# Patient Record
Sex: MALE | Race: WHITE | NOT HISPANIC OR LATINO | Employment: UNEMPLOYED | URBAN - METROPOLITAN AREA
[De-identification: names, ages, dates, MRNs, and addresses within clinical notes are randomized per-mention and may not be internally consistent; named-entity substitution may affect disease eponyms.]

---

## 2017-01-01 ENCOUNTER — APPOINTMENT (OUTPATIENT)
Dept: RADIOLOGY | Facility: MEDICAL CENTER | Age: 27
DRG: 394 | End: 2017-01-01
Attending: INTERNAL MEDICINE
Payer: COMMERCIAL

## 2017-01-01 PROCEDURE — 78226 HEPATOBILIARY SYSTEM IMAGING: CPT

## 2017-01-02 ENCOUNTER — APPOINTMENT (OUTPATIENT)
Dept: RADIOLOGY | Facility: MEDICAL CENTER | Age: 27
DRG: 394 | End: 2017-01-02
Attending: HOSPITALIST
Payer: COMMERCIAL

## 2017-01-02 PROCEDURE — 74176 CT ABD & PELVIS W/O CONTRAST: CPT

## 2017-01-06 ENCOUNTER — HOSPITAL ENCOUNTER (OUTPATIENT)
Facility: MEDICAL CENTER | Age: 27
End: 2017-02-02
Attending: HOSPITALIST | Admitting: HOSPITALIST
Payer: COMMERCIAL

## 2017-01-06 ENCOUNTER — RESOLUTE PROFESSIONAL BILLING HOSPITAL PROF FEE (OUTPATIENT)
Dept: HOSPITALIST | Facility: MEDICAL CENTER | Age: 27
End: 2017-01-06
Payer: COMMERCIAL

## 2017-01-06 PROCEDURE — 87500 VANOMYCIN DNA AMP PROBE: CPT

## 2017-01-06 PROCEDURE — 87641 MR-STAPH DNA AMP PROBE: CPT

## 2017-01-07 LAB
ALBUMIN SERPL BCP-MCNC: 2 G/DL (ref 3.2–4.9)
ALBUMIN/GLOB SERPL: 0.6 G/DL
ALP SERPL-CCNC: 54 U/L (ref 30–99)
ALT SERPL-CCNC: 11 U/L (ref 2–50)
ANION GAP SERPL CALC-SCNC: 9 MMOL/L (ref 0–11.9)
AST SERPL-CCNC: 18 U/L (ref 12–45)
BASOPHILS # BLD AUTO: 0.4 % (ref 0–1.8)
BASOPHILS # BLD: 0.04 K/UL (ref 0–0.12)
BILIRUB SERPL-MCNC: 0.7 MG/DL (ref 0.1–1.5)
BUN SERPL-MCNC: 13 MG/DL (ref 8–22)
CALCIUM SERPL-MCNC: 8.3 MG/DL (ref 8.4–10.2)
CHLORIDE SERPL-SCNC: 109 MMOL/L (ref 96–112)
CO2 SERPL-SCNC: 21 MMOL/L (ref 20–33)
CREAT SERPL-MCNC: 3.25 MG/DL (ref 0.5–1.4)
EOSINOPHIL # BLD AUTO: 0.15 K/UL (ref 0–0.51)
EOSINOPHIL NFR BLD: 1.6 % (ref 0–6.9)
ERYTHROCYTE [DISTWIDTH] IN BLOOD BY AUTOMATED COUNT: 36.6 FL (ref 35.9–50)
GFR SERPL CREATININE-BSD FRML MDRD: 23 ML/MIN/1.73 M 2
GLOBULIN SER CALC-MCNC: 3.1 G/DL (ref 1.9–3.5)
GLUCOSE SERPL-MCNC: 118 MG/DL (ref 65–99)
HCT VFR BLD AUTO: 34.5 % (ref 42–52)
HGB BLD-MCNC: 11.2 G/DL (ref 14–18)
IMM GRANULOCYTES # BLD AUTO: 0.11 K/UL (ref 0–0.11)
IMM GRANULOCYTES NFR BLD AUTO: 1.2 % (ref 0–0.9)
LYMPHOCYTES # BLD AUTO: 1.28 K/UL (ref 1–4.8)
LYMPHOCYTES NFR BLD: 14 % (ref 22–41)
MCH RBC QN AUTO: 26 PG (ref 27–33)
MCHC RBC AUTO-ENTMCNC: 32.5 G/DL (ref 33.7–35.3)
MCV RBC AUTO: 80 FL (ref 81.4–97.8)
MONOCYTES # BLD AUTO: 0.75 K/UL (ref 0–0.85)
MONOCYTES NFR BLD AUTO: 8.2 % (ref 0–13.4)
MRSA DNA SPEC QL NAA+PROBE: NORMAL
NEUTROPHILS # BLD AUTO: 6.81 K/UL (ref 1.82–7.42)
NEUTROPHILS NFR BLD: 74.6 % (ref 44–72)
NRBC # BLD AUTO: 0 K/UL
NRBC BLD AUTO-RTO: 0 /100 WBC
PLATELET # BLD AUTO: 338 K/UL (ref 164–446)
PMV BLD AUTO: 9.8 FL (ref 9–12.9)
POTASSIUM SERPL-SCNC: 3.3 MMOL/L (ref 3.6–5.5)
PROT SERPL-MCNC: 5.1 G/DL (ref 6–8.2)
RBC # BLD AUTO: 4.31 M/UL (ref 4.7–6.1)
SIGNIFICANT IND 70042: NORMAL
SITE SITE: NORMAL
SODIUM SERPL-SCNC: 139 MMOL/L (ref 135–145)
SOURCE SOURCE: NORMAL
VRE DNA SPEC QL NAA+PROBE: NEGATIVE
WBC # BLD AUTO: 9.1 K/UL (ref 4.8–10.8)

## 2017-01-07 PROCEDURE — 99232 SBSQ HOSP IP/OBS MODERATE 35: CPT | Performed by: HOSPITALIST

## 2017-01-07 PROCEDURE — 85025 COMPLETE CBC W/AUTO DIFF WBC: CPT

## 2017-01-07 PROCEDURE — 80053 COMPREHEN METABOLIC PANEL: CPT

## 2017-01-07 ASSESSMENT — ENCOUNTER SYMPTOMS
NAUSEA: 1
CONSTIPATION: 0
CARDIOVASCULAR NEGATIVE: 1
HEADACHES: 0
COUGH: 0
SHORTNESS OF BREATH: 0
FEVER: 0
BACK PAIN: 0
CONSTITUTIONAL NEGATIVE: 1
VOMITING: 1
RESPIRATORY NEGATIVE: 1
EYES NEGATIVE: 1
CHILLS: 0
NEUROLOGICAL NEGATIVE: 1
ABDOMINAL PAIN: 1
WEAKNESS: 1
DIARRHEA: 1
MYALGIAS: 1

## 2017-01-07 NOTE — [facility]
Hospital Medicine Progress Note, Adult, Complex               Author: Paige Farrell Date & Time created: 1/7/2017  3:40 PM     CC: bile leak    Interval History:  Transferred last night  Ongoing N/V with inability to have food intake for 5 days    Review of Systems:  Review of Systems   Constitutional: Negative for fever.   Respiratory: Negative for cough and shortness of breath.    Cardiovascular: Negative for chest pain.   Gastrointestinal: Positive for nausea, vomiting, abdominal pain and diarrhea. Negative for constipation.   Genitourinary: Negative for dysuria.   Musculoskeletal: Negative for back pain.   Skin: Negative for rash.   Neurological: Negative for headaches.       T 97.7 P 54 /85 RR 18 Sa02 92% ra I/O 580/brp 1 emesis  Physical Exam   Constitutional: He appears well-developed.   HENT:   Head: Normocephalic.   Mouth/Throat: No oropharyngeal exudate.   Eyes: Conjunctivae are normal. No scleral icterus.   Neck: No tracheal deviation present.   Cardiovascular: Normal rate, regular rhythm and intact distal pulses.    No murmur heard.  Pulmonary/Chest: Effort normal. No respiratory distress. He has no wheezes. He exhibits no tenderness.   Abdominal: Soft. Bowel sounds are normal. He exhibits no distension. There is tenderness.   Musculoskeletal: He exhibits no edema.   Neurological: He is alert.   Skin: Skin is warm.   Vitals reviewed.      Labs:        Invalid input(s): [id number]      Recent Labs      01/05/17   0859  01/06/17   0237  01/07/17   0025   SODIUM  136  137  139   POTASSIUM  3.7  3.8  3.3*   CHLORIDE  107  108  109   CO2  20  19*  21   BUN  20  18  13   CREATININE  3.63*  3.39*  3.25*   CALCIUM  8.7  8.2*  8.3*     Recent Labs      01/05/17   0859  01/06/17   0237  01/06/17   0901  01/07/17   0025   ALTSGPT   --    --    --   11   ASTSGOT   --    --    --   18   ALKPHOSPHAT   --    --    --   54   TBILIRUBIN   --    --    --   0.7   AMYLASE   --    --   37   --    LIPASE   --     --   38   --    GLUCOSE  115*  143*   --   118*     Recent Labs      01/06/17   0237  01/07/17   0025   RBC  4.05*  4.31*   HEMOGLOBIN  10.9*  11.2*   HEMATOCRIT  32.9*  34.5*   PLATELETCT  339  338     Recent Labs      01/06/17   0237  01/07/17   0025   WBC  8.4  9.1   NEUTSPOLYS  76.50*  74.60*   LYMPHOCYTES  13.30*  14.00*   MONOCYTES  7.60  8.20   EOSINOPHILS  0.90  1.60   BASOPHILS  0.40  0.40   ASTSGOT   --   18   ALTSGPT   --   11   ALKPHOSPHAT   --   54   TBILIRUBIN   --   0.7     Medical Decision Making, by Problem:  S/p cholecystectomy PTA  Bile leak treated with biliary stent  Biloma s/p IR drainage infected with candida   -Rocephin, Flagyl, Fluconazole  Vanco induced ARF   -appreciate nephro assistance  Anemia  N/V   -? abx   -add premed zofran   -add phenergan   -ppn, place picc and tpn if unable to tolerate po  Hypokalemia   -replete K+  HCV  Nutrition   -hopeful for oral intake      Labs reviewed, Medications reviewed and EKG reviewed  [place] catheter: No [place]      DVT Prophylaxis: Heparin      Antibiotics: Treating active infection/contamination beyond 24 hours perioperative coverage

## 2017-01-07 NOTE — PROGRESS NOTES
"[facility] Progress Note                 Author: Ana Sifuentes Date & Time created: 1/7/2017  1:36 PM   Supervising Physician: Dr Yong Camilo    Interval History:  The patient is a very pleasant 26-year-old male who had cholecystitis and underwent a cholecystectomy at the beginning of December.  He initially felt well following this procedure, but then on around 12/12/2016, began having progressive pain in his RUQ, pain was described as deep breathing towards his right shoulder.  He has a history of chronic hepatitis C.  He was found to have a biliary leak and underwent an ERCP with sphincterotomy and biliary stent placement.  He had significant abdominal pain infectious and secondary peritonitis, was subsequently discharged to [facility] for ongoing care.  He was at [facility] for a period of time.  Several days after his transfer to [facility], he was found to have a biloma on CT imaging and underwent a percutaneous drainage by IR.  He then returned to [facility] following this procedure, he continued to be afebrile, was placed on empiric antimicrobial therapy which included Zosyn and vancomycin at that time.  Subsequent drainage cultures demonstrated yeast and was then placed on fluconazole.  Several days after the procedure, the drainage became more serosanguineous.  Followup CT scan demonstrated perihepatic fluid collection, including one in Morison pouch.  Further imaging demonstrated large right pleural effusion as well as new bilateral   hydronephrosis.  He underwent drainage of the perihepatic fluid and right-sided thoracentesis and transferred back to [facility] for ongoing management of these interventional procedures.     The patient tells me he has a remote history of acute renal failure on around the time that he was diagnosed with hepatitis C.  He was told that his kidney numbers \"went up\" the day he was treated as supportively and they improved.   As far he notes they improved to " complete recovery following that incident.     Imaging studies that I could review find that he had a CT scan of the abdomen and pelvis without contrast on 12/13/2016.  CT scan of the abdomen and pelvis with contrast on 12/14/2016.  Additionally, another CT scan of the abdomen and pelvis without contrast on December 22 as well as December 26.   His last contrast exposure was 12/14/2016.  Review his labs finds that his serum creatinine had remained in a relatively normal range under 1.0 during most of this time.  On 12/31/2016, his serum creatinine figueroa to 1.8 and subsequently to 3.69 today.  He is documented to have 1.6 L of urine output today.  He has received no other imaging studies that I could find.  His blood pressure has been relatively stable in the 120-140 range systolic.  There are no urine studies to review.  He has, however, been receiving vancomycin for a prolonged period of time.  His previous levels have remained in the appropriate therapeutic range 13-16, however, most recently it is shot up to 60.3.     DAILY NEPHROLOGY SUMMARY:   1/01/17 - Consult done              1/02/17 - No overnight events.  Off vanc.  On rocephin.                1/03/17 - No events, pt complaining n/v and diarrhea since last night, rate of rise of Cr slowing down and may be plateauing, pt reports   good UOP, BP stable, no SOB or LE edema              1/04/17 - No events, Cr improved today, good UOP, BP stable, still with n/v off/on overnight and pt still reports diarrhea yest and this am              1/05/17 - No events, Cr trending down, good UOP, per-hepatic drain removed yest, BP stable, good UOP, loose BM overnight but none so   far this am, still reports abd tenderness                1/06/17 - No events, still with nausea and episode of emesis last night, still with diarrhea and C-Diff negative, abd pain unchanged,   leukocytosis improved and afebrile, Cr trending down, good UOP     ----------------------------------------------------------------------------------------------------------------------------   1/06/17 - Transferred to [facility]   1/07/17 - No new events. Still with N/V/D. Not on IVFs. UOP good.       Review of Systems:  Review of Systems   Constitutional: Negative.  Negative for fever and chills.   HENT: Negative.    Eyes: Negative.    Respiratory: Negative.    Cardiovascular: Negative.    Gastrointestinal: Positive for nausea, vomiting, abdominal pain and diarrhea.   Genitourinary: Negative.    Musculoskeletal: Positive for myalgias. Negative for back pain and joint pain.   Skin: Negative.  Negative for itching and rash.   Neurological: Negative.        Physical Exam:  Physical Exam   Constitutional: He is oriented to person, place, and time. He appears well-developed and well-nourished.   HENT:   Head: Normocephalic and atraumatic.   Eyes: Conjunctivae and EOM are normal. Pupils are equal, round, and reactive to light.   Neck: Normal range of motion.   Cardiovascular: Normal rate, regular rhythm and normal heart sounds.    Pulmonary/Chest: Effort normal and breath sounds normal. No respiratory distress. He has no wheezes.   Abdominal: Bowel sounds are normal. There is tenderness.   Musculoskeletal: Normal range of motion. He exhibits no edema.   Neurological: He is alert and oriented to person, place, and time.   Skin: Skin is warm and dry.       Labs:        Invalid input(s): [id number]      Recent Labs      01/05/17   0859  01/06/17 0237 01/07/17   0025   SODIUM  136  137  139   POTASSIUM  3.7  3.8  3.3*   CHLORIDE  107  108  109   CO2  20  19*  21   BUN  20  18  13   CREATININE  3.63*  3.39*  3.25*   CALCIUM  8.7  8.2*  8.3*     Recent Labs      01/05/17   0859  01/06/17   0237  01/06/17   0901  01/07/17   0025   ALTSGPT   --    --    --   11   ASTSGOT   --    --    --   18   ALKPHOSPHAT   --    --    --   54   TBILIRUBIN   --    --    --   0.7   AMYLASE   --     --   37   --    LIPASE   --    --   38   --    GLUCOSE  115*  143*   --   118*     Recent Labs      01/06/17   0237  01/07/17   0025   RBC  4.05*  4.31*   HEMOGLOBIN  10.9*  11.2*   HEMATOCRIT  32.9*  34.5*   PLATELETCT  339  338     Recent Labs      01/06/17   0237  01/07/17   0025   WBC  8.4  9.1   NEUTSPOLYS  76.50*  74.60*   LYMPHOCYTES  13.30*  14.00*   MONOCYTES  7.60  8.20   EOSINOPHILS  0.90  1.60   BASOPHILS  0.40  0.40   ASTSGOT   --   18   ALTSGPT   --   11   ALKPHOSPHAT   --   54   TBILIRUBIN   --   0.7           Hemodynamics:  No data recorded.     No Data Recorded         Respiratory:                Fluids:  No intake or output data in the 24 hours ending 01/07/17 1336     GI/Nutrition:  Orders Placed This Encounter   Procedures   • DIET ORDER     Standing Status: Standing      Number of Occurrences: 1      Standing Expiration Date:      Order Specific Question:  Diet:     Answer:  Regular [1]     Order Specific Question:  Diet:     Answer:  Low Fiber(GI Soft) [2]     Medical Decision Making, by Problem:  There are no hospital problems to display for this patient.      Core Measures    ASSESSMENTS:              1.  Acute kidney injury, nonoliguric.                             - Suspect secondary to vancomycin toxicity                          - [place] UA - does NOT suggest extension to ATN - evaluation points to AIN from Vanc.                            - There was a recent history of bilateral hydronephrosis, but that looks to have recovered by recent imaging                             - Last contrast exposure was 12/14/2016.                             - Cr plateaued at 4.6 and now trending down                          - No immediate need for dialysis              2.  History of acute liver failure in the past.                             - He thinks it was associated with hepatitis C.              3.  Hepatitis C              4.  Biliary leak,                            - Status post biliary  stent with perihepatic fluid collection and moderate to large right pleural effusion                          - Status post drains, removed 1/4/17                          - Fungal infection as described above.                          - ID following with ABX adjusted              5.  Significant protein calorie malnourishment    - Not taking PO well              6.  Anemia                          - No need for CHRISTIE with KALEB                          - Iron stores low but will hold off on IV iron given active infection.                          - Transfuse PRN              7. Renal Osteodystrophy                          - PTH = 17                          - Vit D = 30              8. N/V/Diarrhea    - C-Diff studies negative    - Amylase/Lipase wnl     - Not taking PO well, consider parenteral nutrition     SUGGESTIONS:              --No immediate need for dialysis but will eval daily              --Off vancomycin, continue abx per ID recs              --Hold off on IV iron due to active infection   --Start IVF: 1/2 NS @ 100 cc/hour   --Phenergan 12.5 mg IV q 6h PRN N/V              --Dose all medications for GFR less than 30.              --Continue IVF until able to tolerate PO    Thank you,

## 2017-01-08 LAB
ALBUMIN SERPL BCP-MCNC: 2 G/DL (ref 3.2–4.9)
ALBUMIN SERPL BCP-MCNC: 2.2 G/DL (ref 3.2–4.9)
ALBUMIN/GLOB SERPL: 0.6 G/DL
ALBUMIN/GLOB SERPL: 0.7 G/DL
ALP SERPL-CCNC: 52 U/L (ref 30–99)
ALP SERPL-CCNC: 52 U/L (ref 30–99)
ALT SERPL-CCNC: 10 U/L (ref 2–50)
ALT SERPL-CCNC: 11 U/L (ref 2–50)
ANION GAP SERPL CALC-SCNC: 11 MMOL/L (ref 0–11.9)
ANION GAP SERPL CALC-SCNC: 9 MMOL/L (ref 0–11.9)
AST SERPL-CCNC: 14 U/L (ref 12–45)
AST SERPL-CCNC: 14 U/L (ref 12–45)
BASOPHILS # BLD AUTO: 0.5 % (ref 0–1.8)
BASOPHILS # BLD: 0.04 K/UL (ref 0–0.12)
BILIRUB SERPL-MCNC: 0.5 MG/DL (ref 0.1–1.5)
BILIRUB SERPL-MCNC: 0.5 MG/DL (ref 0.1–1.5)
BUN SERPL-MCNC: 11 MG/DL (ref 8–22)
BUN SERPL-MCNC: 15 MG/DL (ref 8–22)
CALCIUM SERPL-MCNC: 8.1 MG/DL (ref 8.4–10.2)
CALCIUM SERPL-MCNC: 8.1 MG/DL (ref 8.4–10.2)
CHLORIDE SERPL-SCNC: 107 MMOL/L (ref 96–112)
CHLORIDE SERPL-SCNC: 108 MMOL/L (ref 96–112)
CO2 SERPL-SCNC: 20 MMOL/L (ref 20–33)
CO2 SERPL-SCNC: 22 MMOL/L (ref 20–33)
CREAT SERPL-MCNC: 2.69 MG/DL (ref 0.5–1.4)
CREAT SERPL-MCNC: 2.82 MG/DL (ref 0.5–1.4)
EOSINOPHIL # BLD AUTO: 0.12 K/UL (ref 0–0.51)
EOSINOPHIL NFR BLD: 1.6 % (ref 0–6.9)
ERYTHROCYTE [DISTWIDTH] IN BLOOD BY AUTOMATED COUNT: 37.2 FL (ref 35.9–50)
GFR SERPL CREATININE-BSD FRML MDRD: 27 ML/MIN/1.73 M 2
GFR SERPL CREATININE-BSD FRML MDRD: 29 ML/MIN/1.73 M 2
GLOBULIN SER CALC-MCNC: 3.1 G/DL (ref 1.9–3.5)
GLOBULIN SER CALC-MCNC: 3.2 G/DL (ref 1.9–3.5)
GLUCOSE SERPL-MCNC: 107 MG/DL (ref 65–99)
GLUCOSE SERPL-MCNC: 93 MG/DL (ref 65–99)
HCT VFR BLD AUTO: 31.4 % (ref 42–52)
HGB BLD-MCNC: 10.5 G/DL (ref 14–18)
IMM GRANULOCYTES # BLD AUTO: 0.09 K/UL (ref 0–0.11)
IMM GRANULOCYTES NFR BLD AUTO: 1.2 % (ref 0–0.9)
LYMPHOCYTES # BLD AUTO: 1.45 K/UL (ref 1–4.8)
LYMPHOCYTES NFR BLD: 19 % (ref 22–41)
MAGNESIUM SERPL-MCNC: 1.4 MG/DL (ref 1.5–2.5)
MCH RBC QN AUTO: 26.3 PG (ref 27–33)
MCHC RBC AUTO-ENTMCNC: 33.4 G/DL (ref 33.7–35.3)
MCV RBC AUTO: 78.7 FL (ref 81.4–97.8)
MONOCYTES # BLD AUTO: 0.63 K/UL (ref 0–0.85)
MONOCYTES NFR BLD AUTO: 8.2 % (ref 0–13.4)
NEUTROPHILS # BLD AUTO: 5.32 K/UL (ref 1.82–7.42)
NEUTROPHILS NFR BLD: 69.5 % (ref 44–72)
NRBC # BLD AUTO: 0 K/UL
NRBC BLD AUTO-RTO: 0 /100 WBC
PHOSPHATE SERPL-MCNC: 3 MG/DL (ref 2.5–4.5)
PLATELET # BLD AUTO: 299 K/UL (ref 164–446)
PMV BLD AUTO: 10 FL (ref 9–12.9)
POTASSIUM SERPL-SCNC: 3.3 MMOL/L (ref 3.6–5.5)
POTASSIUM SERPL-SCNC: 3.4 MMOL/L (ref 3.6–5.5)
PROT SERPL-MCNC: 5.1 G/DL (ref 6–8.2)
PROT SERPL-MCNC: 5.4 G/DL (ref 6–8.2)
RBC # BLD AUTO: 3.99 M/UL (ref 4.7–6.1)
SODIUM SERPL-SCNC: 138 MMOL/L (ref 135–145)
SODIUM SERPL-SCNC: 139 MMOL/L (ref 135–145)
WBC # BLD AUTO: 7.7 K/UL (ref 4.8–10.8)

## 2017-01-08 PROCEDURE — 99232 SBSQ HOSP IP/OBS MODERATE 35: CPT | Performed by: HOSPITALIST

## 2017-01-08 PROCEDURE — 84100 ASSAY OF PHOSPHORUS: CPT

## 2017-01-08 PROCEDURE — 85025 COMPLETE CBC W/AUTO DIFF WBC: CPT

## 2017-01-08 PROCEDURE — 80053 COMPREHEN METABOLIC PANEL: CPT | Mod: 91

## 2017-01-08 PROCEDURE — 83735 ASSAY OF MAGNESIUM: CPT

## 2017-01-08 ASSESSMENT — ENCOUNTER SYMPTOMS
FEVER: 0
ABDOMINAL PAIN: 0
CHILLS: 0
RESPIRATORY NEGATIVE: 1
SHORTNESS OF BREATH: 0
NEUROLOGICAL NEGATIVE: 1
CONSTITUTIONAL NEGATIVE: 1
EYES NEGATIVE: 1
ABDOMINAL PAIN: 1
COUGH: 0
WEAKNESS: 1
MYALGIAS: 1
HEADACHES: 0
VOMITING: 0
CARDIOVASCULAR NEGATIVE: 1
DIARRHEA: 0
CONSTIPATION: 0
BACK PAIN: 0
NAUSEA: 1

## 2017-01-08 NOTE — [facility]
Infectious Disease Progress Note    Author: Amee Lehman Date & Time created: 1/8/2017  2:57 PM    Interval History:  26-year-old male who is status post laparoscopic cholecystectomy with postoperative complications including biliary leak, Candida albicans abscess, and exudative pleural effusion    1/2 AF, WBC 13 c/o RUQ pain, nausea  1/3 AF, WBC 14.5, still with nausea, vomiting and abd pain, renal function worse today  1/4 AF, W BC 11. Still with nausea but no vomiting this am, states he has not eaten in 3 days, Cr slightly improved  1/5 AF, no CBC, drains removed yesterday, still with nausea and poor appetite  1/6 AF, WBC 8.4, still c/o abd pain from drain removal, can't eat, Cr improving  1/7 AF, WBC 9.1, transferred to [facility] overnight, watching football, no changes per pt, Cr continues to improve  1/8 AF, WBC 7.7, was able to eat breakfast without vomiting this am, no appetite at lunch however  Labs Reviewed, Medications Reviewed, Radiology Reviewed and Wound Reviewed.    Review of Systems:  Review of Systems   Constitutional: Positive for malaise/fatigue. Negative for fever and chills.   Cardiovascular: Negative for chest pain.   Gastrointestinal: Positive for abdominal pain.   Neurological: Positive for weakness. Negative for headaches.       Hemodynamics:  T 97.6 P 79 /75 RR 20 Sa02 94% RA          Physical Exam:  Physical Exam   Constitutional: He is oriented to person, place, and time. He appears well-developed.   HENT:   Head: Normocephalic and atraumatic.   Poor dentition   Eyes: EOM are normal. Pupils are equal, round, and reactive to light.   Neck: Neck supple.   Cardiovascular: Normal rate, regular rhythm and normal heart sounds.    Pulmonary/Chest: Effort normal and breath sounds normal.   Abdominal: Soft. There is tenderness.   Drains - removed   Musculoskeletal: He exhibits no edema.   Neurological: He is alert and oriented to person, place, and time.   Skin:   Extensive tattoos        Labs:  Recent Labs      01/06/17   0237  01/07/17   0025  01/08/17   0451   WBC  8.4  9.1  7.7   RBC  4.05*  4.31*  3.99*   HEMOGLOBIN  10.9*  11.2*  10.5*   HEMATOCRIT  32.9*  34.5*  31.4*   MCV  81.2*  80.0*  78.7*   MCH  26.9*  26.0*  26.3*   RDW  37.7  36.6  37.2   PLATELETCT  339  338  299   MPV  9.5  9.8  10.0   NEUTSPOLYS  76.50*  74.60*  69.50   LYMPHOCYTES  13.30*  14.00*  19.00*   MONOCYTES  7.60  8.20  8.20   EOSINOPHILS  0.90  1.60  1.60   BASOPHILS  0.40  0.40  0.50     Recent Labs      01/07/17   0025  01/08/17   0451  01/08/17   1217   SODIUM  139  139  138   POTASSIUM  3.3*  3.3*  3.4*   CHLORIDE  109  108  107   CO2  21  20  22   GLUCOSE  118*  93  107*   BUN  13  11  15     Recent Labs      01/07/17   0025  01/08/17   0451  01/08/17   1217   ALBUMIN  2.0*  2.0*  2.2*   TBILIRUBIN  0.7  0.5  0.5   ALKPHOSPHAT  54  52  52   TOTPROTEIN  5.1*  5.1*  5.4*   ALTSGPT  11  11  10   ASTSGOT  18  14  14   CREATININE  3.25*  2.82*  2.69*       Wound:       Fluids:  Intake/Output     None             Assessment:  •  *Perihepatic fluid collection [K76.89]    •  KALEB (acute kidney injury) (HCC) [N17.9]    •  Leukocytosis [D72.829]    •  Anemia [D64.9]    •  Thrombocytosis (HCC) [D47.3]    •  Elevated alkaline phosphatase level [R74.8]    •  History of hepatitis C [Z86.19]                  Plan:  26-year-old male who is status post laparoscopic cholecystectomy with postoperative complications including biliary leak, Candida albicans abscess, and exudative pleural effusion    Afebrile  Leukocytosis - now resolved  HIV neg  Acute renal failure likely due to vancomycin. Cr improving.   Continue ceftriaxone, fluconazole and flagyl  1/2 Repeat CT - minimal right perihepatic fluid  Anticipate 2 week course of abx. Stop date 1/15/17.   Appreciate Renal eval  C diff - neg

## 2017-01-08 NOTE — [facility]
Hospital Medicine Progress Note, Adult, Complex               Author: Paige MADELINE Farrell Date & Time created: 1/8/2017  2:17 PM     CC: bile leak    Interval History:  Able to eat breakfast with flagyl premedication  PVN started  Asymptomatic lorelei, follow on tele 24 hours    Review of Systems:  Review of Systems   Constitutional: Negative for fever.   Respiratory: Negative for cough and shortness of breath.    Cardiovascular: Negative for chest pain.   Gastrointestinal: Positive for nausea (better). Negative for vomiting, abdominal pain, diarrhea and constipation.   Genitourinary: Negative for dysuria.   Musculoskeletal: Negative for back pain.   Skin: Negative for rash.   Neurological: Negative for headaches.       T 97.6 P 79 /75 RR 20 Sa02 94% ra I/O 3.4/3  Physical Exam   Constitutional: He is oriented to person, place, and time. He appears well-developed.   HENT:   Head: Normocephalic.   Mouth/Throat: No oropharyngeal exudate.   Eyes: Conjunctivae are normal.   Neck: No tracheal deviation present.   Cardiovascular: Regular rhythm and intact distal pulses.    No murmur heard.  lorelei   Pulmonary/Chest: Effort normal. No respiratory distress. He exhibits no tenderness.   Abdominal: Soft. Bowel sounds are normal. He exhibits no distension. There is tenderness.   Musculoskeletal: He exhibits no edema.   Neurological: He is alert and oriented to person, place, and time.   Skin: Skin is warm.   Vitals reviewed.      Labs:        Invalid input(s): [id number]      Recent Labs      01/07/17   0025  01/08/17   0451  01/08/17   1217   SODIUM  139  139  138   POTASSIUM  3.3*  3.3*  3.4*   CHLORIDE  109  108  107   CO2  21  20  22   BUN  13  11  15   CREATININE  3.25*  2.82*  2.69*   MAGNESIUM   --    --   1.4*   PHOSPHORUS   --    --   3.0   CALCIUM  8.3*  8.1*  8.1*     Recent Labs      01/06/17   0901  01/07/17   0025  01/08/17   0451  01/08/17   1217   ALTSGPT   --   11  11  10   ASTSGOT   --   18  14  14    ALKPHOSPHAT   --   54  52  52   TBILIRUBIN   --   0.7  0.5  0.5   AMYLASE  37   --    --    --    LIPASE  38   --    --    --    GLUCOSE   --   118*  93  107*     Recent Labs      01/06/17 0237 01/07/17   0025  01/08/17   0451   RBC  4.05*  4.31*  3.99*   HEMOGLOBIN  10.9*  11.2*  10.5*   HEMATOCRIT  32.9*  34.5*  31.4*   PLATELETCT  339  338  299     Recent Labs      01/06/17 0237 01/07/17 0025  01/08/17   0451  01/08/17   1217   WBC  8.4  9.1  7.7   --    NEUTSPOLYS  76.50*  74.60*  69.50   --    LYMPHOCYTES  13.30*  14.00*  19.00*   --    MONOCYTES  7.60  8.20  8.20   --    EOSINOPHILS  0.90  1.60  1.60   --    BASOPHILS  0.40  0.40  0.50   --    ASTSGOT   --   18  14  14   ALTSGPT   --   11  11  10   ALKPHOSPHAT   --   54  52  52   TBILIRUBIN   --   0.7  0.5  0.5     Medical Decision Making, by Problem:  S/p cholecystectomy PTA  Bile leak treated with biliary stent  Biloma s/p IR drainage infected with candida   -Rocephin, Flagyl, Fluconazole  Vanco induced ARF   -appreciate nephro assistance   -improving daily, no HD anticipated  Anemia  N/V   -improved with scheduled zofran   -ppn, place picc and tpn if unable to tolerate po  Hypokalemia  hypomag   -replete  Bradycardia   -asymptomatic   -follow tele 24 hours  HCV  Nutrition   -follow po intake   -hoping to avoid TPN      Labs reviewed and Medications reviewed  [place] catheter: No [place]      DVT Prophylaxis: Heparin      Antibiotics: Treating active infection/contamination beyond 24 hours perioperative coverage

## 2017-01-08 NOTE — PROGRESS NOTES
"[facility] Progress Note                 Author: Ana Sifuentes Date & Time created: 1/8/2017  12:49 PM   Supervising Physician: Dr Yong Camilo    Interval History:  The patient is a very pleasant 26-year-old male who had cholecystitis and underwent a cholecystectomy at the beginning of December.  He initially felt well following this procedure, but then on around 12/12/2016, began having progressive pain in his RUQ, pain was described as deep breathing towards his right shoulder.  He has a history of chronic hepatitis C.  He was found to have a biliary leak and underwent an ERCP with sphincterotomy and biliary stent placement.  He had significant abdominal pain infectious and secondary peritonitis, was subsequently discharged to [facility] for ongoing care.  He was at [facility] for a period of time.  Several days after his transfer to [facility], he was found to have a biloma on CT imaging and underwent a percutaneous drainage by IR.  He then returned to [facility] following this procedure, he continued to be afebrile, was placed on empiric antimicrobial therapy which included Zosyn and vancomycin at that time.  Subsequent drainage cultures demonstrated yeast and was then placed on fluconazole.  Several days after the procedure, the drainage became more serosanguineous.  Followup CT scan demonstrated perihepatic fluid collection, including one in Morison pouch.  Further imaging demonstrated large right pleural effusion as well as new bilateral   hydronephrosis.  He underwent drainage of the perihepatic fluid and right-sided thoracentesis and transferred back to [facility] for ongoing management of these interventional procedures.     The patient tells me he has a remote history of acute renal failure on around the time that he was diagnosed with hepatitis C.  He was told that his kidney numbers \"went up\" the day he was treated as supportively and they improved.   As far he notes they improved to " complete recovery following that incident.     Imaging studies that I could review find that he had a CT scan of the abdomen and pelvis without contrast on 12/13/2016.  CT scan of the abdomen and pelvis with contrast on 12/14/2016.  Additionally, another CT scan of the abdomen and pelvis without contrast on December 22 as well as December 26.   His last contrast exposure was 12/14/2016.  Review his labs finds that his serum creatinine had remained in a relatively normal range under 1.0 during most of this time.  On 12/31/2016, his serum creatinine figueroa to 1.8 and subsequently to 3.69 today.  He is documented to have 1.6 L of urine output today.  He has received no other imaging studies that I could find.  His blood pressure has been relatively stable in the 120-140 range systolic.  There are no urine studies to review.  He has, however, been receiving vancomycin for a prolonged period of time.  His previous levels have remained in the appropriate therapeutic range 13-16, however, most recently it is shot up to 60.3.     DAILY NEPHROLOGY SUMMARY:   1/01/17 - Consult done              1/02/17 - No overnight events.  Off vanc.  On rocephin.                1/03/17 - No events, pt complaining n/v and diarrhea since last night, rate of rise of Cr slowing down and may be plateauing, pt reports   good UOP, BP stable, no SOB or LE edema              1/04/17 - No events, Cr improved today, good UOP, BP stable, still with n/v off/on overnight and pt still reports diarrhea yest and this am              1/05/17 - No events, Cr trending down, good UOP, per-hepatic drain removed yest, BP stable, good UOP, loose BM overnight but none so   far this am, still reports abd tenderness                1/06/17 - No events, still with nausea and episode of emesis last night, still with diarrhea and C-Diff negative, abd pain unchanged,   leukocytosis improved and afebrile, Cr trending down, good UOP     ----------------------------------------------------------------------------------------------------------------------------   1/06/17 - Transferred to [facility]   1/07/17 - No new events. Still with N/V/D. Not on IVFs. UOP good.     1/08/17 - Scr continues to improve. Back on IVF until taking PO well. Starting PVN. Nausea better. UOP good. Scr improving.     Review of Systems:  Review of Systems   Constitutional: Negative.  Negative for fever and chills.   HENT: Negative.    Eyes: Negative.    Respiratory: Negative.    Cardiovascular: Negative.    Gastrointestinal: Positive for nausea. Negative for vomiting.   Genitourinary: Negative.    Musculoskeletal: Positive for myalgias. Negative for back pain and joint pain.   Skin: Negative.  Negative for itching and rash.   Neurological: Negative.        Physical Exam:  Physical Exam   Constitutional: He is oriented to person, place, and time. He appears well-developed and well-nourished.   HENT:   Head: Normocephalic and atraumatic.   Eyes: Conjunctivae and EOM are normal. Pupils are equal, round, and reactive to light.   Neck: Normal range of motion.   Cardiovascular: Normal rate, regular rhythm and normal heart sounds.    Pulmonary/Chest: Effort normal and breath sounds normal. No respiratory distress. He has no wheezes.   Abdominal: Bowel sounds are normal. There is tenderness.   Musculoskeletal: Normal range of motion. He exhibits no edema.   Neurological: He is alert and oriented to person, place, and time.   Skin: Skin is warm and dry.       Labs:        Invalid input(s): [id number]      Recent Labs      01/07/17   0025  01/08/17   0451  01/08/17   1217   SODIUM  139  139  138   POTASSIUM  3.3*  3.3*  3.4*   CHLORIDE  109  108  107   CO2  21  20  22   BUN  13  11  15   CREATININE  3.25*  2.82*  2.69*   MAGNESIUM   --    --   1.4*   PHOSPHORUS   --    --   3.0   CALCIUM  8.3*  8.1*  8.1*     Recent Labs      01/06/17   0901  01/07/17   0025  01/08/17    0451  01/08/17   1217   ALTSGPT   --   11  11  10   ASTSGOT   --   18  14  14   ALKPHOSPHAT   --   54  52  52   TBILIRUBIN   --   0.7  0.5  0.5   AMYLASE  37   --    --    --    LIPASE  38   --    --    --    GLUCOSE   --   118*  93  107*     Recent Labs      01/06/17 0237 01/07/17   0025  01/08/17 0451   RBC  4.05*  4.31*  3.99*   HEMOGLOBIN  10.9*  11.2*  10.5*   HEMATOCRIT  32.9*  34.5*  31.4*   PLATELETCT  339  338  299     Recent Labs      01/06/17 0237 01/07/17 0025  01/08/17 0451 01/08/17 1217   WBC  8.4  9.1  7.7   --    NEUTSPOLYS  76.50*  74.60*  69.50   --    LYMPHOCYTES  13.30*  14.00*  19.00*   --    MONOCYTES  7.60  8.20  8.20   --    EOSINOPHILS  0.90  1.60  1.60   --    BASOPHILS  0.40  0.40  0.50   --    ASTSGOT   --   18  14  14   ALTSGPT   --   11  11  10   ALKPHOSPHAT   --   54  52  52   TBILIRUBIN   --   0.7  0.5  0.5           Hemodynamics:  No data recorded.     No Data Recorded         Respiratory:                Fluids:  No intake or output data in the 24 hours ending 01/08/17 1249     GI/Nutrition:  Orders Placed This Encounter   Procedures   • DIET ORDER     Standing Status: Standing      Number of Occurrences: 1      Standing Expiration Date:      Order Specific Question:  Diet:     Answer:  Regular [1]     Order Specific Question:  Diet:     Answer:  Low Fiber(GI Soft) [2]     Medical Decision Making, by Problem:  There are no hospital problems to display for this patient.      Core Measures    ASSESSMENTS:              1.  Acute kidney injury, nonoliguric.                             - Suspect secondary to vancomycin toxicity                          - [place] UA - does NOT suggest extension to ATN - evaluation points to AIN from Vanc.                            - There was a recent history of bilateral hydronephrosis, but that looks to have recovered by recent imaging                             - Last contrast exposure was 12/14/2016.                             - Cr  plateaued at 4.6 and now trending down                          - No immediate need for dialysis              2.  History of acute liver failure in the past.                             - He thinks it was associated with hepatitis C.              3.  Hepatitis C              4.  Biliary leak,                            - Status post biliary stent with perihepatic fluid collection and moderate to large right pleural effusion                          - Status post drains, removed 1/4/17                          - Fungal infection as described above.                          - ID following with ABX adjusted              5.  Significant protein calorie malnourishment    - Not taking PO well              6.  Anemia                          - No need for CHRISTIE with KALEB                          - Iron stores low but will hold off on IV iron given active infection.                          - Transfuse PRN              7. Renal Osteodystrophy                          - PTH = 17                          - Vit D = 30              8. N/V/Diarrhea    - C-Diff studies negative    - Amylase/Lipase wnl     - Not taking PO well, consider parenteral nutrition     SUGGESTIONS:              --No immediate need for dialysis but will eval daily              --Off vancomycin, continue abx per ID recs              --Hold off on IV iron due to active infection   --Continue IVF: 1/2 NS @ 100 cc/hour until taking PO well   --Dose all medications for GFR less than 30.                  Thank you,

## 2017-01-08 NOTE — [facility]
Infectious Disease Progress Note    Author: Amee Lehman Date & Time created: 1/7/2017  4:02 PM    Interval History:  26-year-old male who is status post laparoscopic cholecystectomy with postoperative complications including biliary leak, Candida albicans abscess, and exudative pleural effusion    1/2 AF, WBC 13 c/o RUQ pain, nausea  1/3 AF, WBC 14.5, still with nausea, vomiting and abd pain, renal function worse today  1/4 AF, W BC 11. Still with nausea but no vomiting this am, states he has not eaten in 3 days, Cr slightly improved  1/5 AF, no CBC, drains removed yesterday, still with nausea and poor appetite  1/6 AF, WBC 8.4, still c/o abd pain from drain removal, can't eat, Cr improving  1/7 AF, WBC 9.1, transferred to [facility] overnight, watching football, no changes per pt, Cr continues to improve  Labs Reviewed, Medications Reviewed, Radiology Reviewed and Wound Reviewed.    Review of Systems:  Review of Systems   Constitutional: Positive for malaise/fatigue. Negative for fever and chills.   Cardiovascular: Negative for chest pain.   Gastrointestinal: Positive for nausea, vomiting and abdominal pain.   Neurological: Positive for weakness. Negative for headaches.       Hemodynamics:  T 97.7 P 54 /85 RR 18 Sa02 92% RA          Physical Exam:  Physical Exam   Constitutional: He is oriented to person, place, and time. He appears well-developed.   HENT:   Head: Normocephalic and atraumatic.   Poor dentition   Eyes: EOM are normal. Pupils are equal, round, and reactive to light.   Neck: Neck supple.   Cardiovascular: Normal rate, regular rhythm and normal heart sounds.    Pulmonary/Chest: Effort normal and breath sounds normal.   Abdominal: Soft. There is tenderness.   Drains - removed   Musculoskeletal: He exhibits no edema.   Neurological: He is alert and oriented to person, place, and time.   Skin:   Extensive tattoos       Labs:  Recent Labs      01/06/17   0237  01/07/17   0025   WBC  8.4  9.1   RBC   4.05*  4.31*   HEMOGLOBIN  10.9*  11.2*   HEMATOCRIT  32.9*  34.5*   MCV  81.2*  80.0*   MCH  26.9*  26.0*   RDW  37.7  36.6   PLATELETCT  339  338   MPV  9.5  9.8   NEUTSPOLYS  76.50*  74.60*   LYMPHOCYTES  13.30*  14.00*   MONOCYTES  7.60  8.20   EOSINOPHILS  0.90  1.60   BASOPHILS  0.40  0.40     Recent Labs      01/05/17   0859  01/06/17 0237  01/07/17   0025   SODIUM  136  137  139   POTASSIUM  3.7  3.8  3.3*   CHLORIDE  107  108  109   CO2  20  19*  21   GLUCOSE  115*  143*  118*   BUN  20  18  13     Recent Labs      01/05/17 0859  01/06/17 0237  01/07/17   0025   ALBUMIN   --    --   2.0*   TBILIRUBIN   --    --   0.7   ALKPHOSPHAT   --    --   54   TOTPROTEIN   --    --   5.1*   ALTSGPT   --    --   11   ASTSGOT   --    --   18   CREATININE  3.63*  3.39*  3.25*       Wound:       Fluids:  Intake/Output     None             Assessment:  •  *Perihepatic fluid collection [K76.89]    •  KALEB (acute kidney injury) (HCC) [N17.9]    •  Leukocytosis [D72.829]    •  Anemia [D64.9]    •  Thrombocytosis (HCC) [D47.3]    •  Elevated alkaline phosphatase level [R74.8]    •  History of hepatitis C [Z86.19]                  Plan:  26-year-old male who is status post laparoscopic cholecystectomy with postoperative complications including biliary leak, Candida albicans abscess, and exudative pleural effusion    Afebrile  Leukocytosis - now resolved  HIV neg  Acute renal failure likely due to vancomycin. Cr improving. Cleared by renal to [facility]  Continue ceftriaxone and flagyl to cover pneumonia as well as typical biliary pathogens    Continue fluconazole  1/2 Repeat CT - minimal right perihepatic fluid  Anticipate 2 week course of abx. Stop date 1/15/17.   Appreciate Renal eval  C diff - neg

## 2017-01-09 LAB
ALBUMIN SERPL BCP-MCNC: 2.4 G/DL (ref 3.2–4.9)
ALBUMIN/GLOB SERPL: 0.7 G/DL
ALP SERPL-CCNC: 53 U/L (ref 30–99)
ALT SERPL-CCNC: 10 U/L (ref 2–50)
ANION GAP SERPL CALC-SCNC: 9 MMOL/L (ref 0–11.9)
AST SERPL-CCNC: 14 U/L (ref 12–45)
BILIRUB SERPL-MCNC: 0.4 MG/DL (ref 0.1–1.5)
BUN SERPL-MCNC: 18 MG/DL (ref 8–22)
CALCIUM SERPL-MCNC: 8.2 MG/DL (ref 8.4–10.2)
CHLORIDE SERPL-SCNC: 106 MMOL/L (ref 96–112)
CO2 SERPL-SCNC: 23 MMOL/L (ref 20–33)
CREAT SERPL-MCNC: 2.45 MG/DL (ref 0.5–1.4)
GFR SERPL CREATININE-BSD FRML MDRD: 32 ML/MIN/1.73 M 2
GLOBULIN SER CALC-MCNC: 3.3 G/DL (ref 1.9–3.5)
GLUCOSE SERPL-MCNC: 122 MG/DL (ref 65–99)
MAGNESIUM SERPL-MCNC: 1.7 MG/DL (ref 1.5–2.5)
PHOSPHATE SERPL-MCNC: 3.6 MG/DL (ref 2.5–4.5)
POTASSIUM SERPL-SCNC: 3.7 MMOL/L (ref 3.6–5.5)
PROT SERPL-MCNC: 5.7 G/DL (ref 6–8.2)
SODIUM SERPL-SCNC: 138 MMOL/L (ref 135–145)

## 2017-01-09 PROCEDURE — 83735 ASSAY OF MAGNESIUM: CPT

## 2017-01-09 PROCEDURE — 80053 COMPREHEN METABOLIC PANEL: CPT

## 2017-01-09 PROCEDURE — 84100 ASSAY OF PHOSPHORUS: CPT

## 2017-01-09 PROCEDURE — 82962 GLUCOSE BLOOD TEST: CPT

## 2017-01-09 PROCEDURE — 99232 SBSQ HOSP IP/OBS MODERATE 35: CPT | Performed by: HOSPITALIST

## 2017-01-09 ASSESSMENT — ENCOUNTER SYMPTOMS
COUGH: 0
NEUROLOGICAL NEGATIVE: 1
SHORTNESS OF BREATH: 0
MYALGIAS: 1
EYES NEGATIVE: 1
ABDOMINAL PAIN: 1
WEAKNESS: 1
NAUSEA: 1
FEVER: 0
HEADACHES: 0
CONSTITUTIONAL NEGATIVE: 1
CHILLS: 0
RESPIRATORY NEGATIVE: 1
BACK PAIN: 0
CARDIOVASCULAR NEGATIVE: 1
VOMITING: 0

## 2017-01-09 NOTE — [facility]
Hospital Medicine Progress Note, Adult, Complex               Author: Paige MADELINE Farrell Date & Time created: 1/9/2017  9:59 AM     CC: bile leak    Interval History:  Ongoing bradycardia and nausea  Unable to consistently eat  No CP,SOB-hates monitor for beeping    Review of Systems:  Review of Systems   Constitutional: Positive for malaise/fatigue. Negative for fever.   Respiratory: Negative for cough and shortness of breath.    Cardiovascular: Negative for chest pain.   Gastrointestinal: Positive for nausea and abdominal pain. Negative for vomiting.   Genitourinary: Negative for dysuria.   Musculoskeletal: Negative for back pain.   Neurological: Negative for headaches.       T 97.9 P 49 /85 RR 18 Sa02 92% ra I/O 5.4/5   Physical Exam   Constitutional: He is oriented to person, place, and time. He appears well-developed.   HENT:   Head: Normocephalic and atraumatic.   Eyes: Conjunctivae are normal. Right eye exhibits no discharge. Left eye exhibits no discharge. No scleral icterus.   Neck: No tracheal deviation present.   Cardiovascular: Regular rhythm and intact distal pulses.    No murmur heard.  Julio César 40s   Pulmonary/Chest: Effort normal. No respiratory distress. He has no wheezes. He exhibits no tenderness.   Abdominal: Soft. Bowel sounds are normal. He exhibits no distension. There is tenderness.   Musculoskeletal: He exhibits no edema.   Neurological: He is alert and oriented to person, place, and time.   Skin: Skin is warm.   Vitals reviewed.      Labs:        Invalid input(s): [id number]      Recent Labs      01/08/17 0451 01/08/17   1217  01/09/17   0130   SODIUM  139  138  138   POTASSIUM  3.3*  3.4*  3.7   CHLORIDE  108  107  106   CO2  20  22  23   BUN  11  15  18   CREATININE  2.82*  2.69*  2.45*   MAGNESIUM   --   1.4*  1.7   PHOSPHORUS   --   3.0  3.6   CALCIUM  8.1*  8.1*  8.2*     Recent Labs      01/08/17   0451  01/08/17   1217  01/09/17   0130   ALTSGPT  11  10  10   ASTSGOT  14  14   14   ALKPHOSPHAT  52  52  53   TBILIRUBIN  0.5  0.5  0.4   GLUCOSE  93  107*  122*     Recent Labs      01/07/17   0025  01/08/17   0451   RBC  4.31*  3.99*   HEMOGLOBIN  11.2*  10.5*   HEMATOCRIT  34.5*  31.4*   PLATELETCT  338  299     Recent Labs      01/07/17   0025  01/08/17   0451  01/08/17   1217  01/09/17   0130   WBC  9.1  7.7   --    --    NEUTSPOLYS  74.60*  69.50   --    --    LYMPHOCYTES  14.00*  19.00*   --    --    MONOCYTES  8.20  8.20   --    --    EOSINOPHILS  1.60  1.60   --    --    BASOPHILS  0.40  0.50   --    --    ASTSGOT  18  14  14  14   ALTSGPT  11  11  10  10   ALKPHOSPHAT  54  52  52  53   TBILIRUBIN  0.7  0.5  0.5  0.4     Medical Decision Making, by Problem:  S/p cholecystectomy PTA  Bile leak treated with biliary stent  Biloma s/p IR drainage infected with candida   -Fluconazole, change to unasyn for nausea  Vanco induced ARF   -appreciate nephro assistance   -improving daily, no HD anticipated  Anemia  N/V   -improved with scheduled zofran   -ppn, place picc and tpn if unable to tolerate po  Hypokalemia   -repleted  hypomag   -replete  Bradycardia   -asymptomatic   -follow with control of nausea   -elevates with activity/alertness  HCV  Nutrition   -follow po intake   -hoping to avoid TPN      Labs reviewed and Medications reviewed  [place] catheter: No [place]      DVT Prophylaxis: Heparin      Antibiotics: Treating active infection/contamination beyond 24 hours perioperative coverage

## 2017-01-09 NOTE — PROGRESS NOTES
"[facility] Progress Note                 Author: Ana Sifuentes Date & Time created: 1/9/2017  11:37 AM   Supervising Physician: Dr Jodie Garnett    Interval History:  The patient is a very pleasant 26-year-old male who had cholecystitis and underwent a cholecystectomy at the beginning of December.  He initially felt well following this procedure, but then on around 12/12/2016, began having progressive pain in his RUQ, pain was described as deep breathing towards his right shoulder.  He has a history of chronic hepatitis C.  He was found to have a biliary leak and underwent an ERCP with sphincterotomy and biliary stent placement.  He had significant abdominal pain infectious and secondary peritonitis, was subsequently discharged to [facility] for ongoing care.  He was at [facility] for a period of time.  Several days after his transfer to [facility], he was found to have a biloma on CT imaging and underwent a percutaneous drainage by IR.  He then returned to [facility] following this procedure, he continued to be afebrile, was placed on empiric antimicrobial therapy which included Zosyn and vancomycin at that time.  Subsequent drainage cultures demonstrated yeast and was then placed on fluconazole.  Several days after the procedure, the drainage became more serosanguineous.  Followup CT scan demonstrated perihepatic fluid collection, including one in Morison pouch.  Further imaging demonstrated large right pleural effusion as well as new bilateral   hydronephrosis.  He underwent drainage of the perihepatic fluid and right-sided thoracentesis and transferred back to [facility] for ongoing management of these interventional procedures.     The patient tells me he has a remote history of acute renal failure on around the time that he was diagnosed with hepatitis C.  He was told that his kidney numbers \"went up\" the day he was treated as supportively and they improved.   As far he notes they improved to " complete recovery following that incident.     Imaging studies that I could review find that he had a CT scan of the abdomen and pelvis without contrast on 12/13/2016.  CT scan of the abdomen and pelvis with contrast on 12/14/2016.  Additionally, another CT scan of the abdomen and pelvis without contrast on December 22 as well as December 26.   His last contrast exposure was 12/14/2016.  Review his labs finds that his serum creatinine had remained in a relatively normal range under 1.0 during most of this time.  On 12/31/2016, his serum creatinine figueroa to 1.8 and subsequently to 3.69 today.  He is documented to have 1.6 L of urine output today.  He has received no other imaging studies that I could find.  His blood pressure has been relatively stable in the 120-140 range systolic.  There are no urine studies to review.  He has, however, been receiving vancomycin for a prolonged period of time.  His previous levels have remained in the appropriate therapeutic range 13-16, however, most recently it is shot up to 60.3.     DAILY NEPHROLOGY SUMMARY:   1/01/17 - Consult done              1/02/17 - No overnight events.  Off vanc.  On rocephin.                1/03/17 - No events, pt complaining n/v and diarrhea since last night, rate of rise of Cr slowing down and may be plateauing, pt reports   good UOP, BP stable, no SOB or LE edema              1/04/17 - No events, Cr improved today, good UOP, BP stable, still with n/v off/on overnight and pt still reports diarrhea yest and this am              1/05/17 - No events, Cr trending down, good UOP, per-hepatic drain removed yest, BP stable, good UOP, loose BM overnight but none so   far this am, still reports abd tenderness                1/06/17 - No events, still with nausea and episode of emesis last night, still with diarrhea and C-Diff negative, abd pain unchanged,   leukocytosis improved and afebrile, Cr trending down, good UOP     ----------------------------------------------------------------------------------------------------------------------------   1/06/17 - Transferred to [facility]   1/07/17 - No new events. Still with N/V/D. Not on IVFs. UOP good.     1/08/17 - Scr continues to improve. Back on IVF until taking PO well. Starting PVN. Nausea better. UOP good. Scr improving.   1/09/17 - Scr continues to improve. Still some nausea, no vomiting. Eating some but didn't eat breakfast, drinking fine. UOP good.      Review of Systems:  Review of Systems   Constitutional: Negative.  Negative for fever and chills.   HENT: Negative.    Eyes: Negative.    Respiratory: Negative.    Cardiovascular: Negative.    Gastrointestinal: Positive for nausea. Negative for vomiting.   Genitourinary: Negative.    Musculoskeletal: Positive for myalgias. Negative for back pain and joint pain.   Skin: Negative.  Negative for itching and rash.   Neurological: Negative.        Physical Exam:  Physical Exam   Constitutional: He is oriented to person, place, and time. He appears well-developed and well-nourished.   HENT:   Head: Normocephalic and atraumatic.   Eyes: Conjunctivae and EOM are normal. Pupils are equal, round, and reactive to light.   Neck: Normal range of motion.   Cardiovascular: Normal rate, regular rhythm and normal heart sounds.    Pulmonary/Chest: Effort normal and breath sounds normal. No respiratory distress. He has no wheezes.   Abdominal: Bowel sounds are normal. There is tenderness.   Musculoskeletal: Normal range of motion. He exhibits no edema.   Neurological: He is alert and oriented to person, place, and time.   Skin: Skin is warm and dry.       Labs:        Invalid input(s): [id number]      Recent Labs      01/08/17   0451  01/08/17   1217  01/09/17   0130   SODIUM  139  138  138   POTASSIUM  3.3*  3.4*  3.7   CHLORIDE  108  107  106   CO2  20  22  23   BUN  11  15  18   CREATININE  2.82*  2.69*  2.45*   MAGNESIUM   --    1.4*  1.7   PHOSPHORUS   --   3.0  3.6   CALCIUM  8.1*  8.1*  8.2*     Recent Labs      01/08/17   0451  01/08/17   1217  01/09/17   0130   ALTSGPT  11  10  10   ASTSGOT  14  14  14   ALKPHOSPHAT  52  52  53   TBILIRUBIN  0.5  0.5  0.4   GLUCOSE  93  107*  122*     Recent Labs      01/07/17   0025  01/08/17   0451   RBC  4.31*  3.99*   HEMOGLOBIN  11.2*  10.5*   HEMATOCRIT  34.5*  31.4*   PLATELETCT  338  299     Recent Labs      01/07/17   0025  01/08/17   0451  01/08/17 1217 01/09/17   0130   WBC  9.1  7.7   --    --    NEUTSPOLYS  74.60*  69.50   --    --    LYMPHOCYTES  14.00*  19.00*   --    --    MONOCYTES  8.20  8.20   --    --    EOSINOPHILS  1.60  1.60   --    --    BASOPHILS  0.40  0.50   --    --    ASTSGOT  18  14  14  14   ALTSGPT  11  11  10  10   ALKPHOSPHAT  54  52  52  53   TBILIRUBIN  0.7  0.5  0.5  0.4           Hemodynamics:  No data recorded.     No Data Recorded         Respiratory:                Fluids:  No intake or output data in the 24 hours ending 01/09/17 1137     GI/Nutrition:  Orders Placed This Encounter   Procedures   • DIET ORDER     Standing Status: Standing      Number of Occurrences: 1      Standing Expiration Date:      Order Specific Question:  Diet:     Answer:  Regular [1]     Order Specific Question:  Diet:     Answer:  Low Fiber(GI Soft) [2]     Medical Decision Making, by Problem:  There are no hospital problems to display for this patient.      Core Measures    ASSESSMENTS:              1.  Acute kidney injury, nonoliguric.                             - Suspect secondary to vancomycin toxicity                          - [place] UA - does NOT suggest extension to ATN - evaluation points to AIN from Vanc.                            - There was a recent history of bilateral hydronephrosis, but that looks to have recovered by recent imaging                             - Last contrast exposure was 12/14/2016.                             - Cr plateaued at 4.6 and now continues  to trending down                          - No immediate need for dialysis              2.  History of acute liver failure in the past.                             - He thinks it was associated with hepatitis C.              3.  Hepatitis C              4.  Biliary leak,                            - Status post biliary stent with perihepatic fluid collection and moderate to large right pleural effusion                          - Status post drains, removed 1/4/17                          - Fungal infection as described above.                          - ID following with ABX adjusted              5.  Significant protein calorie malnourishment    - Starting to take PO better (fluids not so much food)    - Starting PVN              6.  Anemia                          - No need for CHRISTIE with KALEB                          - Iron stores low but will hold off on IV iron given active infection.                          - Transfuse PRN              7. Renal Osteodystrophy                          - PTH = 17                          - Vit D = 30              8. N/V/Diarrhea    - C-Diff studies negative    - Amylase/Lipase wnl     - Not taking PO well, consider parenteral nutrition     SUGGESTIONS:              --No immediate need for dialysis but will eval daily              --Off vancomycin, continue abx per ID recs              --Hold off on IV iron due to active infection   --Taking fluids well, DC IVF    --Start PVN   --Dose all medications for GFR less than 30.                    The patient was evaluated with the APRN.  I reviewed the her note and agree with her findings and plan as documented.  The chart was reviewed and summarized.  Available labs, imaging, and pertinent patient data were also reviewed.  Available nursing, consultant, and other records were also reviewed.  I am actively involved in the patient's care.    Thank you,

## 2017-01-09 NOTE — [facility]
Infectious Disease Progress Note    Author: Amee Lehman Date & Time created: 1/9/2017  2:15 PM    Interval History:  26-year-old male who is status post laparoscopic cholecystectomy with postoperative complications including biliary leak, Candida albicans abscess, and exudative pleural effusion    1/2 AF, WBC 13 c/o RUQ pain, nausea  1/3 AF, WBC 14.5, still with nausea, vomiting and abd pain, renal function worse today  1/4 AF, W BC 11. Still with nausea but no vomiting this am, states he has not eaten in 3 days, Cr slightly improved  1/5 AF, no CBC, drains removed yesterday, still with nausea and poor appetite  1/6 AF, WBC 8.4, still c/o abd pain from drain removal, can't eat, Cr improving  1/7 AF, WBC 9.1, transferred to [facility] overnight, watching football, no changes per pt, Cr continues to improve  1/8 AF, WBC 7.7, was able to eat breakfast without vomiting this am, no appetite at lunch however  1/9 AF, had nausea after dinner last night, sleepy this morning  Labs Reviewed, Medications Reviewed, Radiology Reviewed and Wound Reviewed.    Review of Systems:  Review of Systems   Constitutional: Positive for malaise/fatigue. Negative for fever and chills.   Cardiovascular: Negative for chest pain.   Gastrointestinal: Positive for nausea and abdominal pain.   Neurological: Positive for weakness. Negative for headaches.       Hemodynamics:  T 97.9 P 49 /85 RR 18 Sa02 92% RA          Physical Exam:  Physical Exam   Constitutional: He is oriented to person, place, and time. He appears well-developed.   HENT:   Head: Normocephalic and atraumatic.   Poor dentition   Eyes: EOM are normal. Pupils are equal, round, and reactive to light.   Neck: Neck supple.   Cardiovascular: Normal rate, regular rhythm and normal heart sounds.    Pulmonary/Chest: Effort normal and breath sounds normal.   Abdominal: Soft. There is tenderness.   Drains - removed   Musculoskeletal: He exhibits no edema.   Neurological: He is alert and  oriented to person, place, and time.   Skin:   Extensive tattoos       Labs:  Recent Labs      01/07/17   0025  01/08/17   0451   WBC  9.1  7.7   RBC  4.31*  3.99*   HEMOGLOBIN  11.2*  10.5*   HEMATOCRIT  34.5*  31.4*   MCV  80.0*  78.7*   MCH  26.0*  26.3*   RDW  36.6  37.2   PLATELETCT  338  299   MPV  9.8  10.0   NEUTSPOLYS  74.60*  69.50   LYMPHOCYTES  14.00*  19.00*   MONOCYTES  8.20  8.20   EOSINOPHILS  1.60  1.60   BASOPHILS  0.40  0.50     Recent Labs      01/08/17   0451  01/08/17   1217  01/09/17   0130   SODIUM  139  138  138   POTASSIUM  3.3*  3.4*  3.7   CHLORIDE  108  107  106   CO2  20  22  23   GLUCOSE  93  107*  122*   BUN  11  15  18     Recent Labs      01/08/17   0451  01/08/17   1217  01/09/17   0130   ALBUMIN  2.0*  2.2*  2.4*   TBILIRUBIN  0.5  0.5  0.4   ALKPHOSPHAT  52  52  53   TOTPROTEIN  5.1*  5.4*  5.7*   ALTSGPT  11  10  10   ASTSGOT  14  14  14   CREATININE  2.82*  2.69*  2.45*       Wound:       Fluids:  Intake/Output     None             Assessment:  •  *Perihepatic fluid collection [K76.89]    •  KALEB (acute kidney injury) (HCC) [N17.9]    •  Leukocytosis [D72.829]    •  Anemia [D64.9]    •  Thrombocytosis (HCC) [D47.3]    •  Elevated alkaline phosphatase level [R74.8]    •  History of hepatitis C [Z86.19]                  Plan:  26-year-old male who is status post laparoscopic cholecystectomy with postoperative complications including biliary leak, Candida albicans abscess, and exudative pleural effusion    Afebrile  Leukocytosis - now resolved  HIV neg  Acute renal failure likely due to vancomycin. Cr improving.   D/c ceftriaxone and flagyl due to nausea  Transition to unasyn to complete abx course  Continue fluconazole  1/2 Repeat CT - minimal right perihepatic fluid  Anticipate 2 week course of abx. Stop date 1/15/17.   Appreciate Renal eval  C diff - neg    DW IM

## 2017-01-10 LAB
ALBUMIN SERPL BCP-MCNC: 2.4 G/DL (ref 3.2–4.9)
ALBUMIN/GLOB SERPL: 0.7 G/DL
ALP SERPL-CCNC: 49 U/L (ref 30–99)
ALT SERPL-CCNC: 9 U/L (ref 2–50)
ANION GAP SERPL CALC-SCNC: 9 MMOL/L (ref 0–11.9)
AST SERPL-CCNC: 14 U/L (ref 12–45)
BASOPHILS # BLD AUTO: 0.5 % (ref 0–1.8)
BASOPHILS # BLD: 0.04 K/UL (ref 0–0.12)
BILIRUB SERPL-MCNC: 0.4 MG/DL (ref 0.1–1.5)
BUN SERPL-MCNC: 18 MG/DL (ref 8–22)
CALCIUM SERPL-MCNC: 8.3 MG/DL (ref 8.4–10.2)
CHLORIDE SERPL-SCNC: 107 MMOL/L (ref 96–112)
CO2 SERPL-SCNC: 24 MMOL/L (ref 20–33)
CREAT SERPL-MCNC: 2.24 MG/DL (ref 0.5–1.4)
EOSINOPHIL # BLD AUTO: 0.19 K/UL (ref 0–0.51)
EOSINOPHIL NFR BLD: 2.4 % (ref 0–6.9)
ERYTHROCYTE [DISTWIDTH] IN BLOOD BY AUTOMATED COUNT: 38.1 FL (ref 35.9–50)
GFR SERPL CREATININE-BSD FRML MDRD: 35 ML/MIN/1.73 M 2
GLOBULIN SER CALC-MCNC: 3.3 G/DL (ref 1.9–3.5)
GLUCOSE BLD-MCNC: 108 MG/DL (ref 65–99)
GLUCOSE BLD-MCNC: 122 MG/DL (ref 65–99)
GLUCOSE SERPL-MCNC: 104 MG/DL (ref 65–99)
HCT VFR BLD AUTO: 33.4 % (ref 42–52)
HGB BLD-MCNC: 11 G/DL (ref 14–18)
IMM GRANULOCYTES # BLD AUTO: 0.1 K/UL (ref 0–0.11)
IMM GRANULOCYTES NFR BLD AUTO: 1.3 % (ref 0–0.9)
LYMPHOCYTES # BLD AUTO: 1.62 K/UL (ref 1–4.8)
LYMPHOCYTES NFR BLD: 20.6 % (ref 22–41)
MCH RBC QN AUTO: 26.3 PG (ref 27–33)
MCHC RBC AUTO-ENTMCNC: 32.9 G/DL (ref 33.7–35.3)
MCV RBC AUTO: 79.9 FL (ref 81.4–97.8)
MONOCYTES # BLD AUTO: 0.75 K/UL (ref 0–0.85)
MONOCYTES NFR BLD AUTO: 9.5 % (ref 0–13.4)
NEUTROPHILS # BLD AUTO: 5.17 K/UL (ref 1.82–7.42)
NEUTROPHILS NFR BLD: 65.7 % (ref 44–72)
NRBC # BLD AUTO: 0 K/UL
NRBC BLD AUTO-RTO: 0 /100 WBC
PLATELET # BLD AUTO: 273 K/UL (ref 164–446)
PMV BLD AUTO: 9.8 FL (ref 9–12.9)
POTASSIUM SERPL-SCNC: 4 MMOL/L (ref 3.6–5.5)
PROT SERPL-MCNC: 5.7 G/DL (ref 6–8.2)
RBC # BLD AUTO: 4.18 M/UL (ref 4.7–6.1)
SODIUM SERPL-SCNC: 140 MMOL/L (ref 135–145)
WBC # BLD AUTO: 7.9 K/UL (ref 4.8–10.8)

## 2017-01-10 PROCEDURE — 80053 COMPREHEN METABOLIC PANEL: CPT

## 2017-01-10 PROCEDURE — 99233 SBSQ HOSP IP/OBS HIGH 50: CPT | Performed by: HOSPITALIST

## 2017-01-10 PROCEDURE — 82962 GLUCOSE BLOOD TEST: CPT

## 2017-01-10 PROCEDURE — 85025 COMPLETE CBC W/AUTO DIFF WBC: CPT

## 2017-01-10 ASSESSMENT — ENCOUNTER SYMPTOMS
NEUROLOGICAL NEGATIVE: 1
NAUSEA: 1
RESPIRATORY NEGATIVE: 1
WEAKNESS: 1
MYALGIAS: 1
MUSCULOSKELETAL NEGATIVE: 1
COUGH: 0
CONSTITUTIONAL NEGATIVE: 1
BACK PAIN: 0
ABDOMINAL PAIN: 1
VOMITING: 0
CHILLS: 0
EYES NEGATIVE: 1
FEVER: 0
CARDIOVASCULAR NEGATIVE: 1
HEADACHES: 0
PALPITATIONS: 0
SHORTNESS OF BREATH: 0

## 2017-01-10 NOTE — PROGRESS NOTES
"[facility] Progress Note                 Author: Ana Sifuentes Date & Time created: 1/10/2017  9:47 AM   Supervising Physician: Dr Jodie Garnett    Interval History:  The patient is a very pleasant 26-year-old male who had cholecystitis and underwent a cholecystectomy at the beginning of December.  He initially felt well following this procedure, but then on around 12/12/2016, began having progressive pain in his RUQ, pain was described as deep breathing towards his right shoulder.  He has a history of chronic hepatitis C.  He was found to have a biliary leak and underwent an ERCP with sphincterotomy and biliary stent placement.  He had significant abdominal pain infectious and secondary peritonitis, was subsequently discharged to [facility] for ongoing care.  He was at [facility] for a period of time.  Several days after his transfer to [facility], he was found to have a biloma on CT imaging and underwent a percutaneous drainage by IR.  He then returned to [facility] following this procedure, he continued to be afebrile, was placed on empiric antimicrobial therapy which included Zosyn and vancomycin at that time.  Subsequent drainage cultures demonstrated yeast and was then placed on fluconazole.  Several days after the procedure, the drainage became more serosanguineous.  Followup CT scan demonstrated perihepatic fluid collection, including one in Morison pouch.  Further imaging demonstrated large right pleural effusion as well as new bilateral   hydronephrosis.  He underwent drainage of the perihepatic fluid and right-sided thoracentesis and transferred back to [facility] for ongoing management of these interventional procedures.     The patient tells me he has a remote history of acute renal failure on around the time that he was diagnosed with hepatitis C.  He was told that his kidney numbers \"went up\" the day he was treated as supportively and they improved.   As far he notes they improved to " complete recovery following that incident.     Imaging studies that I could review find that he had a CT scan of the abdomen and pelvis without contrast on 12/13/2016.  CT scan of the abdomen and pelvis with contrast on 12/14/2016.  Additionally, another CT scan of the abdomen and pelvis without contrast on December 22 as well as December 26.   His last contrast exposure was 12/14/2016.  Review his labs finds that his serum creatinine had remained in a relatively normal range under 1.0 during most of this time.  On 12/31/2016, his serum creatinine figueroa to 1.8 and subsequently to 3.69 today.  He is documented to have 1.6 L of urine output today.  He has received no other imaging studies that I could find.  His blood pressure has been relatively stable in the 120-140 range systolic.  There are no urine studies to review.  He has, however, been receiving vancomycin for a prolonged period of time.  His previous levels have remained in the appropriate therapeutic range 13-16, however, most recently it is shot up to 60.3.     DAILY NEPHROLOGY SUMMARY:   1/01/17 - Consult done              1/02/17 - No overnight events.  Off vanc.  On rocephin.                1/03/17 - No events, pt complaining n/v and diarrhea since last night, rate of rise of Cr slowing down and may be plateauing, pt reports good UOP, BP stable, no SOB or LE edema              1/04/17 - No events, Cr improved today, good UOP, BP stable, still with n/v off/on overnight and pt still reports diarrhea yest and this am 1   1/05/17 - No events, Cr trending down, good UOP, per-hepatic drain removed yest, BP stable, good UOP, loose BM overnight but none so far this am, still reports abd tenderness                1/06/17 - No events, still with nausea and episode of emesis last night, still with diarrhea and C-Diff negative, abd pain unchanged, leukocytosis improved and afebrile, Cr trending down, good UOP     ----------------------------------------------------------------------------------------------------------------------------   1/06/17 - Transferred to [facility]   1/07/17 - No new events. Still with N/V/D. Not on IVFs. UOP good.     1/08/17 - Scr continues to improve. Back on IVF until taking PO well. Starting PVN. Nausea better. UOP good. Scr improving.   1/09/17 - Scr continues to improve. Still some nausea, no vomiting. Eating some but didn't eat breakfast, drinking fine. UOP good.    1/10/17 - Scr continues to improve. Started PVN. Still with nausea, not eating much. BP has been higher, started on hydralazine. HR 40's.     Review of Systems:  Review of Systems   Constitutional: Negative.  Negative for fever and chills.   HENT: Negative.    Eyes: Negative.    Respiratory: Negative.    Cardiovascular: Negative.    Gastrointestinal: Positive for nausea. Negative for vomiting.   Genitourinary: Negative.    Musculoskeletal: Positive for myalgias. Negative for back pain and joint pain.   Skin: Negative.  Negative for itching and rash.   Neurological: Negative.        Physical Exam:  Physical Exam   Constitutional: He is oriented to person, place, and time. He appears well-developed and well-nourished.   HENT:   Head: Normocephalic and atraumatic.   Eyes: Conjunctivae and EOM are normal. Pupils are equal, round, and reactive to light.   Neck: Normal range of motion.   Cardiovascular: Normal rate, regular rhythm and normal heart sounds.    Pulmonary/Chest: Effort normal and breath sounds normal. No respiratory distress. He has no wheezes.   Abdominal: Bowel sounds are normal. There is tenderness.   Musculoskeletal: Normal range of motion. He exhibits no edema.   Neurological: He is alert and oriented to person, place, and time.   Skin: Skin is warm and dry.       Labs:        Invalid input(s): [id number]      Recent Labs      01/08/17   1217  01/09/17   0130  01/10/17   0025   SODIUM  138  138  140    POTASSIUM  3.4*  3.7  4.0   CHLORIDE  107  106  107   CO2  22  23  24   BUN  15  18  18   CREATININE  2.69*  2.45*  2.24*   MAGNESIUM  1.4*  1.7   --    PHOSPHORUS  3.0  3.6   --    CALCIUM  8.1*  8.2*  8.3*     Recent Labs      01/08/17 1217  01/09/17   0130  01/10/17   0025   ALTSGPT  10  10  9   ASTSGOT  14  14  14   ALKPHOSPHAT  52  53  49   TBILIRUBIN  0.5  0.4  0.4   GLUCOSE  107*  122*  104*     Recent Labs      01/08/17   0451  01/10/17   0025   RBC  3.99*  4.18*   HEMOGLOBIN  10.5*  11.0*   HEMATOCRIT  31.4*  33.4*   PLATELETCT  299  273     Recent Labs      01/08/17 0451 01/08/17 1217 01/09/17 0130  01/10/17   0025   WBC  7.7   --    --   7.9   NEUTSPOLYS  69.50   --    --   65.70   LYMPHOCYTES  19.00*   --    --   20.60*   MONOCYTES  8.20   --    --   9.50   EOSINOPHILS  1.60   --    --   2.40   BASOPHILS  0.50   --    --   0.50   ASTSGOT  14  14  14  14   ALTSGPT  11  10  10  9   ALKPHOSPHAT  52  52  53  49   TBILIRUBIN  0.5  0.5  0.4  0.4           Hemodynamics:  No data recorded.     No Data Recorded         Respiratory:                Fluids:  No intake or output data in the 24 hours ending 01/10/17 0947     GI/Nutrition:  Orders Placed This Encounter   Procedures   • DIET ORDER     Standing Status: Standing      Number of Occurrences: 1      Standing Expiration Date:      Order Specific Question:  Diet:     Answer:  Regular [1]     Order Specific Question:  Diet:     Answer:  Low Fiber(GI Soft) [2]     Medical Decision Making, by Problem:  There are no hospital problems to display for this patient.      Core Measures    ASSESSMENTS:              1.  Acute kidney injury, nonoliguric.                             - Suspect secondary to vancomycin toxicity                          - [place] UA - does NOT suggest extension to ATN - evaluation points to AIN from Vanc.                            - There was a recent history of bilateral hydronephrosis, but that looks to have recovered by recent  imaging                             - Last contrast exposure was 12/14/2016.                             - Cr plateaued at 4.6 and now continues to trending down                          - No immediate need for dialysis              2.  History of acute liver failure in the past.                             - He thinks it was associated with hepatitis C.              3.  Hepatitis C              4.  Biliary leak,                            - Status post biliary stent with perihepatic fluid collection and moderate to large right pleural effusion                          - Status post drains, removed 1/4/17                          - Fungal infection as described above.                          - ID following with ABX adjusted              5.  Significant protein calorie malnourishment    - Starting to take PO better (fluids not so much food)    - Starting PVN              6.  Anemia                          - No need for CHRISTIE with KALEB                          - Iron stores low but will hold off on IV iron given active infection.                          - Transfuse PRN              7. Renal Osteodystrophy                          - PTH = 17                          - Vit D = 30              8. N/V/Diarrhea    - C-Diff studies negative    - Amylase/Lipase wnl     - Not taking PO well, consider parenteral nutrition     SUGGESTIONS:              --No immediate need for dialysis but will eval daily.   --Serum creatinine continues to slowly improve              --Off vancomycin, continue abx per ID recs              --Hold off on IV iron due to active infection   --Continue PVN   --Monitor BP   --Dose all medications for GFR less than 30.                Thank you,

## 2017-01-11 ENCOUNTER — APPOINTMENT (OUTPATIENT)
Dept: RADIOLOGY | Facility: MEDICAL CENTER | Age: 27
End: 2017-01-11
Attending: HOSPITALIST
Payer: COMMERCIAL

## 2017-01-11 LAB
ALBUMIN SERPL BCP-MCNC: 2.2 G/DL (ref 3.2–4.9)
ALBUMIN/GLOB SERPL: 0.6 G/DL
ALP SERPL-CCNC: 53 U/L (ref 30–99)
ALT SERPL-CCNC: 12 U/L (ref 2–50)
ANION GAP SERPL CALC-SCNC: 11 MMOL/L (ref 0–11.9)
AST SERPL-CCNC: 17 U/L (ref 12–45)
BILIRUB SERPL-MCNC: 0.4 MG/DL (ref 0.1–1.5)
BUN SERPL-MCNC: 16 MG/DL (ref 8–22)
CALCIUM SERPL-MCNC: 8.4 MG/DL (ref 8.4–10.2)
CHLORIDE SERPL-SCNC: 104 MMOL/L (ref 96–112)
CO2 SERPL-SCNC: 24 MMOL/L (ref 20–33)
CREAT SERPL-MCNC: 1.89 MG/DL (ref 0.5–1.4)
GFR SERPL CREATININE-BSD FRML MDRD: 43 ML/MIN/1.73 M 2
GLOBULIN SER CALC-MCNC: 3.8 G/DL (ref 1.9–3.5)
GLUCOSE BLD-MCNC: 102 MG/DL (ref 65–99)
GLUCOSE BLD-MCNC: 92 MG/DL (ref 65–99)
GLUCOSE SERPL-MCNC: 110 MG/DL (ref 65–99)
POTASSIUM SERPL-SCNC: 3.6 MMOL/L (ref 3.6–5.5)
PROT SERPL-MCNC: 6 G/DL (ref 6–8.2)
SODIUM SERPL-SCNC: 139 MMOL/L (ref 135–145)

## 2017-01-11 PROCEDURE — 71010 DX-CHEST-PORTABLE (1 VIEW): CPT

## 2017-01-11 PROCEDURE — 99232 SBSQ HOSP IP/OBS MODERATE 35: CPT | Performed by: HOSPITALIST

## 2017-01-11 PROCEDURE — 80053 COMPREHEN METABOLIC PANEL: CPT

## 2017-01-11 PROCEDURE — 82962 GLUCOSE BLOOD TEST: CPT | Mod: 91

## 2017-01-11 ASSESSMENT — ENCOUNTER SYMPTOMS
CARDIOVASCULAR NEGATIVE: 1
MUSCULOSKELETAL NEGATIVE: 1
FEVER: 0
RESPIRATORY NEGATIVE: 1
WEAKNESS: 1
ABDOMINAL PAIN: 1
NAUSEA: 1
COUGH: 0
NAUSEA: 0
CONSTITUTIONAL NEGATIVE: 1
PALPITATIONS: 0
HEADACHES: 0
ABDOMINAL PAIN: 0
CHILLS: 0
NEUROLOGICAL NEGATIVE: 1
BACK PAIN: 0
MYALGIAS: 1
EYES NEGATIVE: 1
VOMITING: 0
SHORTNESS OF BREATH: 0

## 2017-01-11 NOTE — [facility]
Infectious Disease Progress Note    Author: Amee Lehman Date & Time created: 1/11/2017  3:37 PM    Interval History:  26-year-old male who is status post laparoscopic cholecystectomy with postoperative complications including biliary leak, Candida albicans abscess, and exudative pleural effusion    1/2 AF, WBC 13 c/o RUQ pain, nausea  1/3 AF, WBC 14.5, still with nausea, vomiting and abd pain, renal function worse today  1/4 AF, W BC 11. Still with nausea but no vomiting this am, states he has not eaten in 3 days, Cr slightly improved  1/5 AF, no CBC, drains removed yesterday, still with nausea and poor appetite  1/6 AF, WBC 8.4, still c/o abd pain from drain removal, can't eat, Cr improving  1/7 AF, WBC 9.1, transferred to [facility] overnight, watching football, no changes per pt, Cr continues to improve  1/8 AF, WBC 7.7, was able to eat breakfast without vomiting this am, no appetite at lunch however  1/9 AF, had nausea after dinner last night, sleepy this morning  1/10 AF, WBC 7.9, c/o HA this am, abx switched to unasyn, Cr continues to improve  1/11 AF, still with nausea but no vomiting x 2 days, ate lunch without issues  Labs Reviewed, Medications Reviewed, Radiology Reviewed and Wound Reviewed.    Review of Systems:  Review of Systems   Constitutional: Positive for malaise/fatigue. Negative for fever and chills.   Cardiovascular: Negative for chest pain.   Gastrointestinal: Positive for nausea and abdominal pain. Negative for vomiting.   Neurological: Positive for weakness. Negative for headaches.       Hemodynamics:  T 97.5 P 44 /74 RR 18 Sa02 94% RA          Physical Exam:  Physical Exam   Constitutional: He is oriented to person, place, and time. He appears well-developed.   HENT:   Head: Normocephalic and atraumatic.   Poor dentition   Eyes: EOM are normal. Pupils are equal, round, and reactive to light.   Neck: Neck supple.   Cardiovascular: Normal rate, regular rhythm and normal heart sounds.     Pulmonary/Chest: Effort normal and breath sounds normal.   Abdominal: Soft. There is tenderness.   Drains - removed   Musculoskeletal: He exhibits no edema.   Neurological: He is alert and oriented to person, place, and time.   Skin:   Extensive tattoos       Labs:  Recent Labs      01/10/17   0025   WBC  7.9   RBC  4.18*   HEMOGLOBIN  11.0*   HEMATOCRIT  33.4*   MCV  79.9*   MCH  26.3*   RDW  38.1   PLATELETCT  273   MPV  9.8   NEUTSPOLYS  65.70   LYMPHOCYTES  20.60*   MONOCYTES  9.50   EOSINOPHILS  2.40   BASOPHILS  0.50     Recent Labs      01/09/17   0130  01/10/17   0025  01/11/17   0051   SODIUM  138  140  139   POTASSIUM  3.7  4.0  3.6   CHLORIDE  106  107  104   CO2  23  24  24   GLUCOSE  122*  104*  110*   BUN  18  18  16     Recent Labs      01/09/17   0130  01/10/17   0025  01/11/17   0051   ALBUMIN  2.4*  2.4*  2.2*   TBILIRUBIN  0.4  0.4  0.4   ALKPHOSPHAT  53  49  53   TOTPROTEIN  5.7*  5.7*  6.0   ALTSGPT  10  9  12   ASTSGOT  14  14  17   CREATININE  2.45*  2.24*  1.89*       Wound:       Fluids:  Intake/Output     None             Assessment:  •  *Perihepatic fluid collection [K76.89]    •  KALEB (acute kidney injury) (HCC) [N17.9]    •  Leukocytosis [D72.829]    •  Anemia [D64.9]    •  Thrombocytosis (HCC) [D47.3]    •  Elevated alkaline phosphatase level [R74.8]    •  History of hepatitis C [Z86.19]                  Plan:  26-year-old male who is status post laparoscopic cholecystectomy with postoperative complications including biliary leak, Candida albicans abscess, and exudative pleural effusion    Afebrile  Leukocytosis - now resolved  HIV neg  Acute renal failure likely due to vancomycin. Cr improving.   D/c'd ceftriaxone and flagyl due to nausea on 1/9  Transitioned to unasyn to complete abx course  Continue fluconazole  1/2 Repeat CT - minimal right perihepatic fluid  Anticipate 2 week course of abx. Stop date 1/15/17.   Appreciate Renal eval  C diff - neg    DW IM

## 2017-01-11 NOTE — PROGRESS NOTES
"[facility] Progress Note                 Author: Ana Sifuentes Date & Time created: 1/11/2017  11:01 AM   Supervising Physician: Dr Jodie Garnett    Interval History:  The patient is a very pleasant 26-year-old male who had cholecystitis and underwent a cholecystectomy at the beginning of December.  He initially felt well following this procedure, but then on around 12/12/2016, began having progressive pain in his RUQ, pain was described as deep breathing towards his right shoulder.  He has a history of chronic hepatitis C.  He was found to have a biliary leak and underwent an ERCP with sphincterotomy and biliary stent placement.  He had significant abdominal pain infectious and secondary peritonitis, was subsequently discharged to [facility] for ongoing care.  He was at [facility] for a period of time.  Several days after his transfer to [facility], he was found to have a biloma on CT imaging and underwent a percutaneous drainage by IR.  He then returned to [facility] following this procedure, he continued to be afebrile, was placed on empiric antimicrobial therapy which included Zosyn and vancomycin at that time.  Subsequent drainage cultures demonstrated yeast and was then placed on fluconazole.  Several days after the procedure, the drainage became more serosanguineous.  Followup CT scan demonstrated perihepatic fluid collection, including one in Morison pouch.  Further imaging demonstrated large right pleural effusion as well as new bilateral   hydronephrosis.  He underwent drainage of the perihepatic fluid and right-sided thoracentesis and transferred back to [facility] for ongoing management of these interventional procedures.     The patient tells me he has a remote history of acute renal failure on around the time that he was diagnosed with hepatitis C.  He was told that his kidney numbers \"went up\" the day he was treated as supportively and they improved.   As far he notes they improved to " complete recovery following that incident.     Imaging studies that I could review find that he had a CT scan of the abdomen and pelvis without contrast on 12/13/2016.  CT scan of the abdomen and pelvis with contrast on 12/14/2016.  Additionally, another CT scan of the abdomen and pelvis without contrast on December 22 as well as December 26.   His last contrast exposure was 12/14/2016.  Review his labs finds that his serum creatinine had remained in a relatively normal range under 1.0 during most of this time.  On 12/31/2016, his serum creatinine figueroa to 1.8 and subsequently to 3.69 today.  He is documented to have 1.6 L of urine output today.  He has received no other imaging studies that I could find.  His blood pressure has been relatively stable in the 120-140 range systolic.  There are no urine studies to review.  He has, however, been receiving vancomycin for a prolonged period of time.  His previous levels have remained in the appropriate therapeutic range 13-16, however, most recently it is shot up to 60.3.     DAILY NEPHROLOGY SUMMARY:   1/01/17 - Consult done              1/02/17 - No overnight events.  Off vanc.  On rocephin.                1/03/17 - No events, pt complaining n/v and diarrhea since last night, rate of rise of Cr slowing down and may be plateauing, pt reports good UOP, BP stable, no SOB or LE edema              1/04/17 - No events, Cr improved today, good UOP, BP stable, still with n/v off/on overnight and pt still reports diarrhea yest and this am 1   1/05/17 - No events, Cr trending down, good UOP, per-hepatic drain removed yest, BP stable, good UOP, loose BM overnight but none so far this am, still reports abd tenderness                1/06/17 - No events, still with nausea and episode of emesis last night, still with diarrhea and C-Diff negative, abd pain unchanged, leukocytosis improved and afebrile, Cr trending down, good UOP   "  ----------------------------------------------------------------------------------------------------------------------------   1/06/17 - Transferred to [facility]   1/07/17 - No new events. Still with N/V/D. Not on IVFs. UOP good.     1/08/17 - Scr continues to improve. Back on IVF until taking PO well. Starting PVN. Nausea better. UOP good. Scr improving.   1/09/17 - Scr continues to improve. Still some nausea, no vomiting. Eating some but didn't eat breakfast, drinking fine. UOP good.    1/10/17 - Scr continues to improve. Started PVN. Still with nausea, not eating much. BP has been higher, started on hydralazine. HR    40's.   1/11/17 - Scr continues to improve. Still w/ nausea but no vomiting. \"Food on TV is actually starting to look good\". Now w/PICC.onPVN.     Review of Systems:  Review of Systems   Constitutional: Negative.  Negative for fever and chills.   HENT: Negative.    Eyes: Negative.    Respiratory: Negative.    Cardiovascular: Negative.    Gastrointestinal: Positive for nausea. Negative for vomiting.   Genitourinary: Negative.    Musculoskeletal: Positive for myalgias. Negative for back pain and joint pain.   Skin: Negative.  Negative for itching and rash.   Neurological: Negative.        Physical Exam:  Physical Exam   Constitutional: He is oriented to person, place, and time. He appears well-developed and well-nourished.   HENT:   Head: Normocephalic and atraumatic.   Eyes: Conjunctivae and EOM are normal. Pupils are equal, round, and reactive to light.   Neck: Normal range of motion.   Cardiovascular: Normal rate, regular rhythm and normal heart sounds.    Pulmonary/Chest: Effort normal and breath sounds normal. No respiratory distress. He has no wheezes.   Abdominal: Bowel sounds are normal. There is tenderness.   Musculoskeletal: Normal range of motion. He exhibits no edema.   Neurological: He is alert and oriented to person, place, and time.   Skin: Skin is warm and dry.       Labs:    "     Invalid input(s): [id number]      Recent Labs      01/08/17   1217  01/09/17   0130  01/10/17   0025  01/11/17   0051   SODIUM  138  138  140  139   POTASSIUM  3.4*  3.7  4.0  3.6   CHLORIDE  107  106  107  104   CO2  22  23  24  24   BUN  15  18  18  16   CREATININE  2.69*  2.45*  2.24*  1.89*   MAGNESIUM  1.4*  1.7   --    --    PHOSPHORUS  3.0  3.6   --    --    CALCIUM  8.1*  8.2*  8.3*  8.4     Recent Labs      01/09/17   0130  01/10/17   0025  01/11/17   0051   ALTSGPT  10  9  12   ASTSGOT  14  14  17   ALKPHOSPHAT  53  49  53   TBILIRUBIN  0.4  0.4  0.4   GLUCOSE  122*  104*  110*     Recent Labs      01/10/17   0025   RBC  4.18*   HEMOGLOBIN  11.0*   HEMATOCRIT  33.4*   PLATELETCT  273     Recent Labs      01/09/17   0130  01/10/17   0025  01/11/17   0051   WBC   --   7.9   --    NEUTSPOLYS   --   65.70   --    LYMPHOCYTES   --   20.60*   --    MONOCYTES   --   9.50   --    EOSINOPHILS   --   2.40   --    BASOPHILS   --   0.50   --    ASTSGOT  14 14  17   ALTSGPT  10  9  12   ALKPHOSPHAT  53  49  53   TBILIRUBIN  0.4  0.4  0.4           Hemodynamics:  No data recorded.     No Data Recorded         Respiratory:                Fluids:  No intake or output data in the 24 hours ending 01/11/17 1101     GI/Nutrition:  Orders Placed This Encounter   Procedures   • DIET ORDER     Standing Status: Standing      Number of Occurrences: 1      Standing Expiration Date:      Order Specific Question:  Diet:     Answer:  Regular [1]     Order Specific Question:  Diet:     Answer:  Low Fiber(GI Soft) [2]     Medical Decision Making, by Problem:  There are no hospital problems to display for this patient.      Core Measures    ASSESSMENTS:              1.  Acute kidney injury, nonoliguric.                             - Suspect secondary to vancomycin toxicity                          - [place] UA - does NOT suggest extension to ATN - evaluation points to AIN from Vanc.                            - There was a recent  history of bilateral hydronephrosis, but that looks to have recovered by recent imaging                             - Last contrast exposure was 12/14/2016.                             - Cr plateaued at 4.6 and now continues to trending down                          - No immediate need for dialysis              2.  History of acute liver failure in the past.                             - He thinks it was associated with hepatitis C.              3.  Hepatitis C              4.  Biliary leak,                            - Status post biliary stent with perihepatic fluid collection and moderate to large right pleural effusion                          - Status post drains, removed 1/4/17                          - Fungal infection as described above.                          - ID following with ABX adjusted              5.  Significant protein calorie malnourishment    - Starting to take PO better (fluids not so much food)    - Starting PVN              6.  Anemia                          - No need for CHRISTIE with KALEB                          - Iron stores low but will hold off on IV iron given active infection.                          - Transfuse PRN              7. Renal Osteodystrophy                          - PTH = 17                          - Vit D = 30              8. N/V/Diarrhea    - C-Diff studies negative    - Amylase/Lipase wnl     - Not taking PO well, consider parenteral nutrition     SUGGESTIONS:              --No immediate need for dialysis but will eval daily.   --Serum creatinine continues to slowly improve              --Off vancomycin, continue abx per ID recs              --Hold off on IV iron due to active infection   --Continue PVN   --Monitor BP   --Dose all medications for GFR less than 30.                Thank you,

## 2017-01-11 NOTE — [facility]
Hospital Medicine Progress Note, Adult, Complex               Author: Keisha Geraldine Date & Time created: 1/10/2017  10:28 PM     Interval History:  CC - infected biloma  Still w/ pain and nausea.    Review of Systems:  Review of Systems   Constitutional: Negative for fever and chills.   HENT: Negative.    Eyes: Negative.    Respiratory: Negative for cough and shortness of breath.    Cardiovascular: Negative for chest pain and palpitations.   Gastrointestinal: Positive for nausea and abdominal pain. Negative for vomiting.   Genitourinary: Negative.    Musculoskeletal: Negative.    Skin: Negative for itching and rash.   Endo/Heme/Allergies: Negative.        Physical Exam:  Physical Exam   Constitutional: He is oriented to person, place, and time. He appears well-developed and well-nourished. No distress.   HENT:   Head: Normocephalic and atraumatic.   Right Ear: External ear normal.   Left Ear: External ear normal.   Eyes: Conjunctivae and EOM are normal. Right eye exhibits no discharge. Left eye exhibits no discharge.   Neck: Normal range of motion. Neck supple. No tracheal deviation present.   Cardiovascular:   Bradycardia, SB   Pulmonary/Chest: Effort normal and breath sounds normal. No stridor. No respiratory distress. He has no wheezes.   Abdominal: Soft. Bowel sounds are normal. He exhibits no distension. There is no tenderness. There is no rebound and no guarding.   Musculoskeletal: He exhibits no edema or tenderness.   Neurological: He is alert and oriented to person, place, and time.   Skin: Skin is warm and dry. He is not diaphoretic.   Vitals reviewed.      Labs:        Invalid input(s): [id number]      Recent Labs      01/08/17   1217  01/09/17   0130  01/10/17   0025   SODIUM  138  138  140   POTASSIUM  3.4*  3.7  4.0   CHLORIDE  107  106  107   CO2  22  23  24   BUN  15  18  18   CREATININE  2.69*  2.45*  2.24*   MAGNESIUM  1.4*  1.7   --    PHOSPHORUS  3.0  3.6   --    CALCIUM  8.1*  8.2*  8.3*     Recent  Labs      01/08/17   1217  01/09/17   0130  01/10/17   0025   ALTSGPT  10  10  9   ASTSGOT  14  14  14   ALKPHOSPHAT  52  53  49   TBILIRUBIN  0.5  0.4  0.4   GLUCOSE  107*  122*  104*     Recent Labs      01/08/17   0451  01/10/17   0025   RBC  3.99*  4.18*   HEMOGLOBIN  10.5*  11.0*   HEMATOCRIT  31.4*  33.4*   PLATELETCT  299  273     Recent Labs      01/08/17   0451  01/08/17   1217  01/09/17   0130  01/10/17   0025   WBC  7.7   --    --   7.9   NEUTSPOLYS  69.50   --    --   65.70   LYMPHOCYTES  19.00*   --    --   20.60*   MONOCYTES  8.20   --    --   9.50   EOSINOPHILS  1.60   --    --   2.40   BASOPHILS  0.50   --    --   0.50   ASTSGOT  14  14  14  14   ALTSGPT  11  10  10  9   ALKPHOSPHAT  52  52  53  49   TBILIRUBIN  0.5  0.5  0.4  0.4             Medical Decision Making, by Problem:  S/P RECENT CHOLECYSTECTOMY  BILE LEAK S/P BILIARY STENT  YEAST INFECTED BILOMA S/P IR DRAINAGE - cont Fluconazole/Unasyn through 1/15/17 per ID  S/P RIGHT THORACENTESIS - check F/U CXR  S/P BILAT HYDRONEPHROSIS  HTN - start Hydralazine  ASYMPTOMATIC BRADYCARDIA - Hydralazine should help to increase HR  KALEB - 2/2 Vanco, Nephro following  ANEMIA - follow H/H, add Vit C to Fe  HCV  CONSTIPATION - start Docusate and Senna  SPCM - PVN until PO improves  DEBILITY - PT/OT    Reviewed w/ pt and Guards        Medications reviewed and Labs reviewed  [place] catheter: No [place]      DVT Prophylaxis: Heparin    Ulcer prophylaxis: Yes  Antibiotics: Treating active infection/contamination beyond 24 hours perioperative coverage

## 2017-01-12 LAB
GLUCOSE BLD-MCNC: 118 MG/DL (ref 65–99)
GLUCOSE BLD-MCNC: 119 MG/DL (ref 65–99)
GLUCOSE BLD-MCNC: 123 MG/DL (ref 65–99)
GLUCOSE BLD-MCNC: 140 MG/DL (ref 65–99)

## 2017-01-12 PROCEDURE — 82962 GLUCOSE BLOOD TEST: CPT | Mod: 91

## 2017-01-12 PROCEDURE — 99232 SBSQ HOSP IP/OBS MODERATE 35: CPT | Performed by: HOSPITALIST

## 2017-01-12 ASSESSMENT — ENCOUNTER SYMPTOMS
VOMITING: 0
NEUROLOGICAL NEGATIVE: 1
BACK PAIN: 0
FEVER: 0
CONSTITUTIONAL NEGATIVE: 1
SHORTNESS OF BREATH: 0
CARDIOVASCULAR NEGATIVE: 1
NAUSEA: 0
NAUSEA: 1
ABDOMINAL PAIN: 0
COUGH: 0
RESPIRATORY NEGATIVE: 1
EYES NEGATIVE: 1
MUSCULOSKELETAL NEGATIVE: 1
MYALGIAS: 1
CHILLS: 0
PALPITATIONS: 0

## 2017-01-12 NOTE — [facility]
Hospital Medicine Progress Note, Adult, Complex               Author: Valdes Geraldine Date & Time created: 1/11/2017  4:52 PM     Interval History:  CC - infected biloma  Abd pain and nausea much improved.  F/U CXR shows right effusion.    Review of Systems:  Review of Systems   Constitutional: Negative for fever and chills.   HENT: Negative.    Eyes: Negative.    Respiratory: Negative for cough and shortness of breath.    Cardiovascular: Negative for chest pain and palpitations.   Gastrointestinal: Negative for nausea, vomiting and abdominal pain.   Genitourinary: Negative.    Musculoskeletal: Negative.    Skin: Negative for itching and rash.   Endo/Heme/Allergies: Negative.        Physical Exam:  Physical Exam   Constitutional: He is oriented to person, place, and time. He appears well-developed and well-nourished. No distress.   HENT:   Head: Normocephalic and atraumatic.   Right Ear: External ear normal.   Left Ear: External ear normal.   Nose: Nose normal.   Mouth/Throat: Oropharynx is clear and moist.   Eyes: Conjunctivae and EOM are normal. Right eye exhibits no discharge. Left eye exhibits no discharge. No scleral icterus.   Neck: Normal range of motion. Neck supple. No tracheal deviation present.   Cardiovascular:   Bradycardia, SB   Pulmonary/Chest: Effort normal and breath sounds normal. No stridor. No respiratory distress. He has no wheezes.   Abdominal: Soft. Bowel sounds are normal. He exhibits no distension. There is no tenderness. There is no rebound and no guarding.   Musculoskeletal: He exhibits no edema or tenderness.   Neurological: He is alert and oriented to person, place, and time.   Skin: Skin is warm and dry. He is not diaphoretic.   Vitals reviewed.      Labs:        Invalid input(s): [id number]      Recent Labs      01/09/17   0130  01/10/17   0025  01/11/17   0051   SODIUM  138  140  139   POTASSIUM  3.7  4.0  3.6   CHLORIDE  106  107  104   CO2  23  24  24   BUN  18  18  16   CREATININE   2.45*  2.24*  1.89*   MAGNESIUM  1.7   --    --    PHOSPHORUS  3.6   --    --    CALCIUM  8.2*  8.3*  8.4     Recent Labs      01/09/17   0130  01/10/17   0025  01/11/17   0051   ALTSGPT  10  9  12   ASTSGOT  14  14  17   ALKPHOSPHAT  53  49  53   TBILIRUBIN  0.4  0.4  0.4   GLUCOSE  122*  104*  110*     Recent Labs      01/10/17   0025   RBC  4.18*   HEMOGLOBIN  11.0*   HEMATOCRIT  33.4*   PLATELETCT  273     Recent Labs      01/09/17   0130  01/10/17   0025  01/11/17   0051   WBC   --   7.9   --    NEUTSPOLYS   --   65.70   --    LYMPHOCYTES   --   20.60*   --    MONOCYTES   --   9.50   --    EOSINOPHILS   --   2.40   --    BASOPHILS   --   0.50   --    ASTSGOT  14  14  17   ALTSGPT  10  9  12   ALKPHOSPHAT  53  49  53   TBILIRUBIN  0.4  0.4  0.4             Medical Decision Making, by Problem:  S/P RECENT CHOLECYSTECTOMY  BILE LEAK S/P BILIARY STENT  YEAST INFECTED BILOMA S/P IR DRAINAGE - cont Fluconazole/Unasyn through 1/15/17 per ID  S/P RIGHT THORACENTESIS - F/U CXR shows recurrence, start Lasix  S/P BILAT HYDRONEPHROSIS  HTN - Hydralazine  ASYMPTOMATIC BRADYCARDIA - better w/ Hydralazine  KALEB - 2/2 Vanco, Nephro following  ANEMIA - follow H/H, Fe/Vit C  HCV  CONSTIPATION - Docusate and Senna  SPCM - now on TPN  DEBILITY - PT/OT    Reviewed w/ Staff        Medications reviewed and Labs reviewed  [place] catheter: No [place]      DVT Prophylaxis: Heparin    Ulcer prophylaxis: Yes  Antibiotics: Treating active infection/contamination beyond 24 hours perioperative coverage

## 2017-01-12 NOTE — PROGRESS NOTES
"[facility] Progress Note                 Author: Ana Sifuentes Date & Time created: 1/12/2017  12:10 PM   Supervising Physician: Dr Jodie Garnett    Interval History:  The patient is a very pleasant 26-year-old male who had cholecystitis and underwent a cholecystectomy at the beginning of December.  He initially felt well following this procedure, but then on around 12/12/2016, began having progressive pain in his RUQ, pain was described as deep breathing towards his right shoulder.  He has a history of chronic hepatitis C.  He was found to have a biliary leak and underwent an ERCP with sphincterotomy and biliary stent placement.  He had significant abdominal pain infectious and secondary peritonitis, was subsequently discharged to [facility] for ongoing care.  He was at [facility] for a period of time.  Several days after his transfer to [facility], he was found to have a biloma on CT imaging and underwent a percutaneous drainage by IR.  He then returned to [facility] following this procedure, he continued to be afebrile, was placed on empiric antimicrobial therapy which included Zosyn and vancomycin at that time.  Subsequent drainage cultures demonstrated yeast and was then placed on fluconazole.  Several days after the procedure, the drainage became more serosanguineous.  Followup CT scan demonstrated perihepatic fluid collection, including one in Morison pouch.  Further imaging demonstrated large right pleural effusion as well as new bilateral   hydronephrosis.  He underwent drainage of the perihepatic fluid and right-sided thoracentesis and transferred back to [facility] for ongoing management of these interventional procedures.     The patient tells me he has a remote history of acute renal failure on around the time that he was diagnosed with hepatitis C.  He was told that his kidney numbers \"went up\" the day he was treated as supportively and they improved.   As far he notes they improved to " complete recovery following that incident.     Imaging studies that I could review find that he had a CT scan of the abdomen and pelvis without contrast on 12/13/2016.  CT scan of the abdomen and pelvis with contrast on 12/14/2016.  Additionally, another CT scan of the abdomen and pelvis without contrast on December 22 as well as December 26.   His last contrast exposure was 12/14/2016.  Review his labs finds that his serum creatinine had remained in a relatively normal range under 1.0 during most of this time.  On 12/31/2016, his serum creatinine figueroa to 1.8 and subsequently to 3.69 today.  He is documented to have 1.6 L of urine output today.  He has received no other imaging studies that I could find.  His blood pressure has been relatively stable in the 120-140 range systolic.  There are no urine studies to review.  He has, however, been receiving vancomycin for a prolonged period of time.  His previous levels have remained in the appropriate therapeutic range 13-16, however, most recently it is shot up to 60.3.     DAILY NEPHROLOGY SUMMARY:   1/01/17 - Consult done              1/02/17 - No overnight events.  Off vanc.  On rocephin.                1/03/17 - No events, pt complaining n/v and diarrhea since last night, rate of rise of Cr slowing down and may be plateauing, pt reports good UOP, BP stable, no SOB or LE edema              1/04/17 - No events, Cr improved today, good UOP, BP stable, still with n/v off/on overnight and pt still reports diarrhea yest and this am 1   1/05/17 - No events, Cr trending down, good UOP, per-hepatic drain removed yest, BP stable, good UOP, loose BM overnight but none so far this am, still reports abd tenderness                1/06/17 - No events, still with nausea and episode of emesis last night, still with diarrhea and C-Diff negative, abd pain unchanged, leukocytosis improved and afebrile, Cr trending down, good UOP   "  ----------------------------------------------------------------------------------------------------------------------------   1/06/17 - Transferred to [facility]   1/07/17 - No new events. Still with N/V/D. Not on IVFs. UOP good.     1/08/17 - Scr continues to improve. Back on IVF until taking PO well. Starting PVN. Nausea better. UOP good. Scr improving.   1/09/17 - Scr continues to improve. Still some nausea, no vomiting. Eating some but didn't eat breakfast, drinking fine. UOP good.    1/10/17 - Scr continues to improve. Started PVN. Still with nausea, not eating much. BP has been higher, started on hydralazine. HR 40's.   1/11/17 - Scr continues to improve. Still w/ nausea but no vomiting. \"Food on TV is actually starting to look good\". Now w/PICC.onPVN.    1/12/17 - No new labs. No new events. Still with nausea, no vomiting. Refused his anti-nausea this am.    Review of Systems:  Review of Systems   Constitutional: Negative.  Negative for fever and chills.   HENT: Negative.    Eyes: Negative.    Respiratory: Negative.    Cardiovascular: Negative.    Gastrointestinal: Positive for nausea. Negative for vomiting.   Genitourinary: Negative.    Musculoskeletal: Positive for myalgias. Negative for back pain and joint pain.   Skin: Negative.  Negative for itching and rash.   Neurological: Negative.        Physical Exam:  Physical Exam   Constitutional: He is oriented to person, place, and time. He appears well-developed and well-nourished.   HENT:   Head: Normocephalic and atraumatic.   Eyes: Conjunctivae and EOM are normal. Pupils are equal, round, and reactive to light.   Neck: Normal range of motion.   Cardiovascular: Normal rate, regular rhythm and normal heart sounds.    Pulmonary/Chest: Effort normal and breath sounds normal. No respiratory distress. He has no wheezes.   Abdominal: Bowel sounds are normal. There is tenderness.   Musculoskeletal: Normal range of motion. He exhibits no edema. "   Neurological: He is alert and oriented to person, place, and time.   Skin: Skin is warm and dry.       Labs:        Invalid input(s): [id number]      Recent Labs      01/10/17   0025  01/11/17   0051   SODIUM  140  139   POTASSIUM  4.0  3.6   CHLORIDE  107  104   CO2  24  24   BUN  18  16   CREATININE  2.24*  1.89*   CALCIUM  8.3*  8.4     Recent Labs      01/10/17   0025  01/11/17   0051   ALTSGPT  9  12   ASTSGOT  14  17   ALKPHOSPHAT  49  53   TBILIRUBIN  0.4  0.4   GLUCOSE  104*  110*     Recent Labs      01/10/17   0025   RBC  4.18*   HEMOGLOBIN  11.0*   HEMATOCRIT  33.4*   PLATELETCT  273     Recent Labs      01/10/17   0025  01/11/17   0051   WBC  7.9   --    NEUTSPOLYS  65.70   --    LYMPHOCYTES  20.60*   --    MONOCYTES  9.50   --    EOSINOPHILS  2.40   --    BASOPHILS  0.50   --    ASTSGOT  14  17   ALTSGPT  9  12   ALKPHOSPHAT  49  53   TBILIRUBIN  0.4  0.4           Hemodynamics:  No data recorded.     No Data Recorded         Respiratory:                Fluids:  No intake or output data in the 24 hours ending 01/12/17 1210     GI/Nutrition:  Orders Placed This Encounter   Procedures   • DIET ORDER     Standing Status: Standing      Number of Occurrences: 1      Standing Expiration Date:      Order Specific Question:  Diet:     Answer:  Regular [1]     Order Specific Question:  Diet:     Answer:  Low Fiber(GI Soft) [2]     Medical Decision Making, by Problem:  There are no hospital problems to display for this patient.      Core Measures    ASSESSMENTS:              1.  Acute kidney injury, nonoliguric.                             - Suspect secondary to vancomycin toxicity                          - [place] UA - does NOT suggest extension to ATN - evaluation points to AIN from Vanc.                            - There was a recent history of bilateral hydronephrosis, but that looks to have recovered by recent imaging                             - Last contrast exposure was 12/14/2016.                              - Cr plateaued at 4.6 and now continues to trending down                          - No immediate need for dialysis              2.  History of acute liver failure in the past.                             - He thinks it was associated with hepatitis C.              3.  Hepatitis C              4.  Biliary leak,                            - Status post biliary stent with perihepatic fluid collection and moderate to large right pleural effusion                          - Status post drains, removed 1/4/17                          - Fungal infection as described above.                          - ID following with ABX adjusted              5.  Significant protein calorie malnourishment    - Starting to take PO better (fluids not so much food)    - Starting PVN              6.  Anemia                          - No need for CHRISTIE with KALEB                          - Iron stores low but will hold off on IV iron given active infection.                          - Transfuse PRN              7. Renal Osteodystrophy                          - PTH = 17                          - Vit D = 30              8. N/V/Diarrhea    - C-Diff studies negative    - Amylase/Lipase wnl     - Not taking PO well, consider parenteral nutrition     SUGGESTIONS:              --No immediate need for dialysis but will eval daily.   --No new labs today               --Off vancomycin, continue abx per ID recs              --Hold off on IV iron due to active infection   --Continue PVN   --Monitor BP   --Dose all medications for GFR less than 30.                Thank you,

## 2017-01-13 ENCOUNTER — APPOINTMENT (OUTPATIENT)
Dept: RADIOLOGY | Facility: MEDICAL CENTER | Age: 27
End: 2017-01-13
Attending: HOSPITALIST
Payer: COMMERCIAL

## 2017-01-13 LAB
ALBUMIN SERPL BCP-MCNC: 2.5 G/DL (ref 3.2–4.9)
ALBUMIN/GLOB SERPL: 0.7 G/DL
ALP SERPL-CCNC: 58 U/L (ref 30–99)
ALT SERPL-CCNC: 26 U/L (ref 2–50)
ANION GAP SERPL CALC-SCNC: 9 MMOL/L (ref 0–11.9)
AST SERPL-CCNC: 41 U/L (ref 12–45)
BASOPHILS # BLD AUTO: 0.6 % (ref 0–1.8)
BASOPHILS # BLD: 0.04 K/UL (ref 0–0.12)
BILIRUB SERPL-MCNC: 0.6 MG/DL (ref 0.1–1.5)
BUN SERPL-MCNC: 16 MG/DL (ref 8–22)
CALCIUM SERPL-MCNC: 8.4 MG/DL (ref 8.4–10.2)
CHLORIDE SERPL-SCNC: 104 MMOL/L (ref 96–112)
CO2 SERPL-SCNC: 26 MMOL/L (ref 20–33)
CREAT SERPL-MCNC: 1.59 MG/DL (ref 0.5–1.4)
EOSINOPHIL # BLD AUTO: 0.21 K/UL (ref 0–0.51)
EOSINOPHIL NFR BLD: 3.1 % (ref 0–6.9)
ERYTHROCYTE [DISTWIDTH] IN BLOOD BY AUTOMATED COUNT: 38.6 FL (ref 35.9–50)
GFR SERPL CREATININE-BSD FRML MDRD: 52 ML/MIN/1.73 M 2
GLOBULIN SER CALC-MCNC: 3.6 G/DL (ref 1.9–3.5)
GLUCOSE SERPL-MCNC: 103 MG/DL (ref 65–99)
HCT VFR BLD AUTO: 33.8 % (ref 42–52)
HGB BLD-MCNC: 11.3 G/DL (ref 14–18)
IMM GRANULOCYTES # BLD AUTO: 0.08 K/UL (ref 0–0.11)
IMM GRANULOCYTES NFR BLD AUTO: 1.2 % (ref 0–0.9)
LYMPHOCYTES # BLD AUTO: 1.66 K/UL (ref 1–4.8)
LYMPHOCYTES NFR BLD: 24.9 % (ref 22–41)
MAGNESIUM SERPL-MCNC: 1.5 MG/DL (ref 1.5–2.5)
MCH RBC QN AUTO: 26.8 PG (ref 27–33)
MCHC RBC AUTO-ENTMCNC: 33.4 G/DL (ref 33.7–35.3)
MCV RBC AUTO: 80.1 FL (ref 81.4–97.8)
MONOCYTES # BLD AUTO: 0.7 K/UL (ref 0–0.85)
MONOCYTES NFR BLD AUTO: 10.5 % (ref 0–13.4)
NEUTROPHILS # BLD AUTO: 3.99 K/UL (ref 1.82–7.42)
NEUTROPHILS NFR BLD: 59.7 % (ref 44–72)
NRBC # BLD AUTO: 0 K/UL
NRBC BLD AUTO-RTO: 0 /100 WBC
PHOSPHATE SERPL-MCNC: 4.3 MG/DL (ref 2.5–4.5)
PLATELET # BLD AUTO: 247 K/UL (ref 164–446)
PMV BLD AUTO: 9.7 FL (ref 9–12.9)
POTASSIUM SERPL-SCNC: 3.7 MMOL/L (ref 3.6–5.5)
PROT SERPL-MCNC: 6.1 G/DL (ref 6–8.2)
RBC # BLD AUTO: 4.22 M/UL (ref 4.7–6.1)
SODIUM SERPL-SCNC: 139 MMOL/L (ref 135–145)
TRIGL SERPL-MCNC: 93 MG/DL (ref 0–149)
WBC # BLD AUTO: 6.7 K/UL (ref 4.8–10.8)

## 2017-01-13 PROCEDURE — 93971 EXTREMITY STUDY: CPT

## 2017-01-13 PROCEDURE — 83735 ASSAY OF MAGNESIUM: CPT

## 2017-01-13 PROCEDURE — 93971 EXTREMITY STUDY: CPT | Mod: 26 | Performed by: SURGERY

## 2017-01-13 PROCEDURE — 99232 SBSQ HOSP IP/OBS MODERATE 35: CPT | Performed by: HOSPITALIST

## 2017-01-13 PROCEDURE — 82962 GLUCOSE BLOOD TEST: CPT | Mod: 91

## 2017-01-13 PROCEDURE — 80053 COMPREHEN METABOLIC PANEL: CPT

## 2017-01-13 PROCEDURE — 84100 ASSAY OF PHOSPHORUS: CPT

## 2017-01-13 PROCEDURE — 85025 COMPLETE CBC W/AUTO DIFF WBC: CPT

## 2017-01-13 PROCEDURE — 84478 ASSAY OF TRIGLYCERIDES: CPT

## 2017-01-13 ASSESSMENT — ENCOUNTER SYMPTOMS
PALPITATIONS: 0
FEVER: 0
ABDOMINAL PAIN: 1
SHORTNESS OF BREATH: 0
ABDOMINAL PAIN: 0
NAUSEA: 0
NAUSEA: 1
COUGH: 0
WEAKNESS: 1
MUSCULOSKELETAL NEGATIVE: 1
EYES NEGATIVE: 1
HEADACHES: 0
VOMITING: 0
CHILLS: 0

## 2017-01-13 NOTE — [facility]
Infectious Disease Progress Note    Author: Amee Lehman Date & Time created: 1/13/2017  8:12 AM    Interval History:  26-year-old male who is status post laparoscopic cholecystectomy with postoperative complications including biliary leak, Candida albicans abscess, and exudative pleural effusion    1/2 AF, WBC 13 c/o RUQ pain, nausea  1/3 AF, WBC 14.5, still with nausea, vomiting and abd pain, renal function worse today  1/4 AF, W BC 11. Still with nausea but no vomiting this am, states he has not eaten in 3 days, Cr slightly improved  1/5 AF, no CBC, drains removed yesterday, still with nausea and poor appetite  1/6 AF, WBC 8.4, still c/o abd pain from drain removal, can't eat, Cr improving  1/7 AF, WBC 9.1, transferred to [facility] overnight, watching football, no changes per pt, Cr continues to improve  1/8 AF, WBC 7.7, was able to eat breakfast without vomiting this am, no appetite at lunch however  1/9 AF, had nausea after dinner last night, sleepy this morning  1/10 AF, WBC 7.9, c/o HA this am, abx switched to unasyn, Cr continues to improve  1/11 AF, still with nausea but no vomiting x 2 days, ate lunch without issues  1/13 AF, WBC 6.7, nausea improving, no vomiting, tolerated dinner last night, abd pain improving  Labs Reviewed, Medications Reviewed, Radiology Reviewed and Wound Reviewed.    Review of Systems:  Review of Systems   Constitutional: Positive for malaise/fatigue. Negative for fever and chills.   Cardiovascular: Negative for chest pain.   Gastrointestinal: Positive for nausea and abdominal pain. Negative for vomiting.   Neurological: Positive for weakness. Negative for headaches.       Hemodynamics:  T 98.5 P 63 /75 RR 16 Sa02 98% RA          Physical Exam:  Physical Exam   Constitutional: He is oriented to person, place, and time. He appears well-developed.   HENT:   Head: Normocephalic and atraumatic.   Poor dentition   Eyes: EOM are normal. Pupils are equal, round, and reactive to light.    Neck: Neck supple.   Cardiovascular: Normal rate, regular rhythm and normal heart sounds.    Pulmonary/Chest: Effort normal and breath sounds normal.   Abdominal: Soft. There is tenderness.   Drains - removed   Musculoskeletal: He exhibits no edema.   Neurological: He is alert and oriented to person, place, and time.   Skin:   Extensive tattoos       Labs:  Recent Labs      01/13/17   0020   WBC  6.7   RBC  4.22*   HEMOGLOBIN  11.3*   HEMATOCRIT  33.8*   MCV  80.1*   MCH  26.8*   RDW  38.6   PLATELETCT  247   MPV  9.7   NEUTSPOLYS  59.70   LYMPHOCYTES  24.90   MONOCYTES  10.50   EOSINOPHILS  3.10   BASOPHILS  0.60     Recent Labs      01/11/17   0051  01/13/17   0020   SODIUM  139  139   POTASSIUM  3.6  3.7   CHLORIDE  104  104   CO2  24  26   GLUCOSE  110*  103*   BUN  16  16     Recent Labs      01/11/17   0051  01/13/17   0020   ALBUMIN  2.2*  2.5*   TBILIRUBIN  0.4  0.6   ALKPHOSPHAT  53  58   TOTPROTEIN  6.0  6.1   ALTSGPT  12  26   ASTSGOT  17  41   CREATININE  1.89*  1.59*       Wound:       Fluids:  Intake/Output     None             Assessment:  •  *Perihepatic fluid collection [K76.89]    •  KALEB (acute kidney injury) (HCC) [N17.9]    •  Leukocytosis [D72.829]    •  Anemia [D64.9]    •  Thrombocytosis (HCC) [D47.3]    •  Elevated alkaline phosphatase level [R74.8]    •  History of hepatitis C [Z86.19]                  Plan:  26-year-old male who is status post laparoscopic cholecystectomy with postoperative complications including biliary leak, Candida albicans abscess, and exudative pleural effusion    Afebrile  Leukocytosis - now resolved  HIV neg  Acute renal failure likely due to vancomycin. Cr improving.   D/c'd ceftriaxone and flagyl due to nausea on 1/9  Transitioned to unasyn to complete abx course  Continue fluconazole  1/2 Repeat CT - minimal right perihepatic fluid  Anticipate 2 week course of abx. Stop date 1/15/17.   Appreciate Renal eval  C diff - neg    DW IM

## 2017-01-13 NOTE — [facility]
Hospital Medicine Progress Note, Adult, Complex               Author: Valdes Geraldine Date & Time created: 1/12/2017  6:28 PM     Interval History:  CC - infected biloma  No new complaints today.    Review of Systems:  Review of Systems   Constitutional: Negative for fever and chills.   HENT: Negative.    Eyes: Negative.    Respiratory: Negative for cough and shortness of breath.    Cardiovascular: Negative for chest pain and palpitations.   Gastrointestinal: Negative for nausea, vomiting and abdominal pain.   Genitourinary: Negative.    Musculoskeletal: Negative.    Skin: Negative for itching and rash.   Endo/Heme/Allergies: Negative.        Physical Exam:  Physical Exam   Constitutional: He is oriented to person, place, and time. He appears well-developed and well-nourished. No distress.   HENT:   Head: Normocephalic and atraumatic.   Right Ear: External ear normal.   Left Ear: External ear normal.   Nose: Nose normal.   Mouth/Throat: Oropharynx is clear and moist.   Eyes: Conjunctivae and EOM are normal. Right eye exhibits no discharge. Left eye exhibits no discharge.   Neck: Normal range of motion. Neck supple. No tracheal deviation present.   Cardiovascular:   Bradycardia, SB   Pulmonary/Chest: Effort normal and breath sounds normal. No stridor. No respiratory distress. He has no wheezes.   Abdominal: Soft. Bowel sounds are normal. He exhibits no distension. There is no tenderness. There is no rebound and no guarding.   Musculoskeletal: He exhibits no edema or tenderness.   Neurological: He is alert and oriented to person, place, and time.   Skin: Skin is warm and dry. He is not diaphoretic.   Vitals reviewed.      Labs:        Invalid input(s): [id number]      Recent Labs      01/10/17   0025  01/11/17 0051   SODIUM  140  139   POTASSIUM  4.0  3.6   CHLORIDE  107  104   CO2  24  24   BUN  18  16   CREATININE  2.24*  1.89*   CALCIUM  8.3*  8.4     Recent Labs      01/10/17   0025  01/11/17 0051   ALTSGPT  9  12    ASTSGOT  14  17   ALKPHOSPHAT  49  53   TBILIRUBIN  0.4  0.4   GLUCOSE  104*  110*     Recent Labs      01/10/17   0025   RBC  4.18*   HEMOGLOBIN  11.0*   HEMATOCRIT  33.4*   PLATELETCT  273     Recent Labs      01/10/17   0025  01/11/17   0051   WBC  7.9   --    NEUTSPOLYS  65.70   --    LYMPHOCYTES  20.60*   --    MONOCYTES  9.50   --    EOSINOPHILS  2.40   --    BASOPHILS  0.50   --    ASTSGOT  14  17   ALTSGPT  9  12   ALKPHOSPHAT  49  53   TBILIRUBIN  0.4  0.4             Medical Decision Making, by Problem:  S/P RECENT CHOLECYSTECTOMY  BILE LEAK S/P BILIARY STENT  YEAST INFECTED BILOMA S/P IR DRAINAGE - cont Fluconazole/Unasyn through 1/15/17 per ID  S/P RIGHT THORACENTESIS - F/U CXR shows recurrence, so started Lasix  S/P BILAT HYDRONEPHROSIS  HTN - Hydralazine  ASYMPTOMATIC BRADYCARDIA - better w/ Hydralazine  KALEB - 2/2 Vanco, resolving, Nephro following  ANEMIA - follow H/H, Fe/Vit C  HCV  CONSTIPATION - Docusate and Senna  SPCM - PO and TPN for nutritional support  DEBILITY - PT/OT    Reviewed w/ pt and Guards        Medications reviewed and Labs reviewed  [place] catheter: No [place]  Central line in place: Need for access    DVT Prophylaxis: Heparin    Ulcer prophylaxis: Yes  Antibiotics: Treating active infection/contamination beyond 24 hours perioperative coverage

## 2017-01-14 LAB
ALBUMIN SERPL BCP-MCNC: 2.8 G/DL (ref 3.2–4.9)
ALBUMIN/GLOB SERPL: 0.7 G/DL
ALP SERPL-CCNC: 59 U/L (ref 30–99)
ALT SERPL-CCNC: 38 U/L (ref 2–50)
ANION GAP SERPL CALC-SCNC: 9 MMOL/L (ref 0–11.9)
AST SERPL-CCNC: 47 U/L (ref 12–45)
BASOPHILS # BLD AUTO: 0.5 % (ref 0–1.8)
BASOPHILS # BLD: 0.04 K/UL (ref 0–0.12)
BILIRUB SERPL-MCNC: 0.4 MG/DL (ref 0.1–1.5)
BUN SERPL-MCNC: 17 MG/DL (ref 8–22)
CALCIUM SERPL-MCNC: 8.7 MG/DL (ref 8.4–10.2)
CHLORIDE SERPL-SCNC: 101 MMOL/L (ref 96–112)
CO2 SERPL-SCNC: 26 MMOL/L (ref 20–33)
CREAT SERPL-MCNC: 1.45 MG/DL (ref 0.5–1.4)
EOSINOPHIL # BLD AUTO: 0.18 K/UL (ref 0–0.51)
EOSINOPHIL NFR BLD: 2.2 % (ref 0–6.9)
ERYTHROCYTE [DISTWIDTH] IN BLOOD BY AUTOMATED COUNT: 39.8 FL (ref 35.9–50)
GFR SERPL CREATININE-BSD FRML MDRD: 58 ML/MIN/1.73 M 2
GLOBULIN SER CALC-MCNC: 4.1 G/DL (ref 1.9–3.5)
GLUCOSE BLD-MCNC: 108 MG/DL (ref 65–99)
GLUCOSE BLD-MCNC: 112 MG/DL (ref 65–99)
GLUCOSE BLD-MCNC: 131 MG/DL (ref 65–99)
GLUCOSE SERPL-MCNC: 239 MG/DL (ref 65–99)
HCT VFR BLD AUTO: 34.9 % (ref 42–52)
HGB BLD-MCNC: 11.7 G/DL (ref 14–18)
IMM GRANULOCYTES # BLD AUTO: 0.08 K/UL (ref 0–0.11)
IMM GRANULOCYTES NFR BLD AUTO: 1 % (ref 0–0.9)
LYMPHOCYTES # BLD AUTO: 1.77 K/UL (ref 1–4.8)
LYMPHOCYTES NFR BLD: 21.5 % (ref 22–41)
MAGNESIUM SERPL-MCNC: 2.1 MG/DL (ref 1.5–2.5)
MCH RBC QN AUTO: 26.5 PG (ref 27–33)
MCHC RBC AUTO-ENTMCNC: 33.5 G/DL (ref 33.7–35.3)
MCV RBC AUTO: 79 FL (ref 81.4–97.8)
MONOCYTES # BLD AUTO: 0.76 K/UL (ref 0–0.85)
MONOCYTES NFR BLD AUTO: 9.2 % (ref 0–13.4)
NEUTROPHILS # BLD AUTO: 5.42 K/UL (ref 1.82–7.42)
NEUTROPHILS NFR BLD: 65.6 % (ref 44–72)
NRBC # BLD AUTO: 0 K/UL
NRBC BLD AUTO-RTO: 0 /100 WBC
PHOSPHATE SERPL-MCNC: 3.7 MG/DL (ref 2.5–4.5)
PLATELET # BLD AUTO: 268 K/UL (ref 164–446)
PMV BLD AUTO: 10.2 FL (ref 9–12.9)
POTASSIUM SERPL-SCNC: 3.9 MMOL/L (ref 3.6–5.5)
PROT SERPL-MCNC: 6.9 G/DL (ref 6–8.2)
RBC # BLD AUTO: 4.42 M/UL (ref 4.7–6.1)
SODIUM SERPL-SCNC: 136 MMOL/L (ref 135–145)
WBC # BLD AUTO: 8.3 K/UL (ref 4.8–10.8)

## 2017-01-14 PROCEDURE — 84100 ASSAY OF PHOSPHORUS: CPT

## 2017-01-14 PROCEDURE — 85025 COMPLETE CBC W/AUTO DIFF WBC: CPT

## 2017-01-14 PROCEDURE — 82962 GLUCOSE BLOOD TEST: CPT

## 2017-01-14 PROCEDURE — 99232 SBSQ HOSP IP/OBS MODERATE 35: CPT | Performed by: HOSPITALIST

## 2017-01-14 PROCEDURE — 83735 ASSAY OF MAGNESIUM: CPT

## 2017-01-14 PROCEDURE — 80053 COMPREHEN METABOLIC PANEL: CPT

## 2017-01-14 ASSESSMENT — ENCOUNTER SYMPTOMS
FEVER: 0
ABDOMINAL PAIN: 1
MUSCULOSKELETAL NEGATIVE: 1
HEADACHES: 0
NAUSEA: 1
PALPITATIONS: 0
SHORTNESS OF BREATH: 0
COUGH: 0
EYES NEGATIVE: 1
VOMITING: 0
CHILLS: 0

## 2017-01-14 NOTE — [facility]
"Hospital Medicine Progress Note, Adult, Complex               Author: Keisha Geraldine Date & Time created: 1/13/2017  6:41 PM     Interval History:  CC - infected biloma  Pt c/o that his \"sleep cycle is all messed up\" because he stays up until 4 am to watch TV.    Review of Systems:  Review of Systems   Constitutional: Negative for fever and chills.   HENT: Negative.    Eyes: Negative.    Respiratory: Negative for cough and shortness of breath.    Cardiovascular: Negative for chest pain and palpitations.   Gastrointestinal: Negative for nausea, vomiting and abdominal pain.   Genitourinary: Negative.    Musculoskeletal: Negative.    Skin: Negative for itching and rash.   Endo/Heme/Allergies: Negative.        Physical Exam:  Physical Exam   Constitutional: He is oriented to person, place, and time. He appears well-developed and well-nourished. No distress.   HENT:   Head: Normocephalic and atraumatic.   Right Ear: External ear normal.   Left Ear: External ear normal.   Nose: Nose normal.   Mouth/Throat: Oropharynx is clear and moist.   Eyes: Conjunctivae and EOM are normal. Right eye exhibits no discharge. Left eye exhibits no discharge.   Neck: Normal range of motion. Neck supple. No tracheal deviation present.   Cardiovascular:   Bradycardia, SB   Pulmonary/Chest: Effort normal and breath sounds normal. No stridor. No respiratory distress. He has no wheezes.   Abdominal: Soft. Bowel sounds are normal. He exhibits no distension. There is no tenderness. There is no rebound and no guarding.   Musculoskeletal: He exhibits no edema or tenderness.   Neurological: He is alert and oriented to person, place, and time.   Skin: Skin is warm and dry. He is not diaphoretic.   Vitals reviewed.      Labs:        Invalid input(s): [id number]      Recent Labs      01/11/17   0051  01/13/17   0020   SODIUM  139  139   POTASSIUM  3.6  3.7   CHLORIDE  104  104   CO2  24  26   BUN  16  16   CREATININE  1.89*  1.59*   MAGNESIUM   --   1.5 "   PHOSPHORUS   --   4.3   CALCIUM  8.4  8.4     Recent Labs      01/11/17   0051  01/13/17   0020   ALTSGPT  12  26   ASTSGOT  17  41   ALKPHOSPHAT  53  58   TBILIRUBIN  0.4  0.6   GLUCOSE  110*  103*     Recent Labs      01/13/17   0020   RBC  4.22*   HEMOGLOBIN  11.3*   HEMATOCRIT  33.8*   PLATELETCT  247     Recent Labs      01/11/17   0051  01/13/17   0020   WBC   --   6.7   NEUTSPOLYS   --   59.70   LYMPHOCYTES   --   24.90   MONOCYTES   --   10.50   EOSINOPHILS   --   3.10   BASOPHILS   --   0.60   ASTSGOT  17  41   ALTSGPT  12  26   ALKPHOSPHAT  53  58   TBILIRUBIN  0.4  0.6             Medical Decision Making, by Problem:  S/P RECENT CHOLECYSTECTOMY  BILE LEAK S/P BILIARY STENT  YEAST INFECTED BILOMA S/P IR DRAINAGE - cont Fluconazole/Unasyn through 1/15/17 per ID  S/P RIGHT THORACENTESIS - F/U CXR shows recurrence of pleural effusion, so started Lasix  S/P BILAT HYDRONEPHROSIS  HTN - BP controlled w/ Hydralazine  ASYMPTOMATIC BRADYCARDIA - HR improved w/ Hydralazine  KALEB - 2/2 Vanco, resolving, Nephro following  ANEMIA - follow H/H, Fe/Vit C  HYPOMAGNESEMIA - replete IV and increase in TPN  HCV  CONSTIPATION - Docusate and Senna  SPCM - PO and TPN for nutritional support  DEBILITY - PT/OT    Reviewed w/ pt, Guards, and RN        Medications reviewed and Labs reviewed  [place] catheter: No [place]  Central line in place: Need for access    DVT Prophylaxis: Heparin    Ulcer prophylaxis: Yes  Antibiotics: Treating active infection/contamination beyond 24 hours perioperative coverage

## 2017-01-14 NOTE — [facility]
Infectious Disease Progress Note    Author: Joy Morillo M.D. Date & Time created: 1/14/2017  3:00 PM    Interval History:  26-year-old male who is status post laparoscopic cholecystectomy with postoperative complications including biliary leak, Candida albicans abscess, and exudative pleural effusion    1/2 AF, WBC 13 c/o RUQ pain, nausea  1/3 AF, WBC 14.5, still with nausea, vomiting and abd pain, renal function worse today  1/4 AF, W BC 11. Still with nausea but no vomiting this am, states he has not eaten in 3 days, Cr slightly improved  1/5 AF, no CBC, drains removed yesterday, still with nausea and poor appetite  1/6 AF, WBC 8.4, still c/o abd pain from drain removal, can't eat, Cr improving  1/7 AF, WBC 9.1, transferred to [facility] overnight, watching football, no changes per pt, Cr continues to improve  1/8 AF, WBC 7.7, was able to eat breakfast without vomiting this am, no appetite at lunch however  1/9 AF, had nausea after dinner last night, sleepy this morning  1/10 AF, WBC 7.9, c/o HA this am, abx switched to unasyn, Cr continues to improve  1/11 AF, still with nausea but no vomiting x 2 days, ate lunch without issues  1/13 AF, WBC 6.7, nausea improving, no vomiting, tolerated dinner last night, abd pain improving  1/14 AF WBC 8.3 still on TPN Drain out Poor oral intake  Labs Reviewed, Medications Reviewed, Radiology Reviewed and Wound Reviewed.    Review of Systems:  Review of Systems   Constitutional: Positive for malaise/fatigue. Negative for fever and chills.   Cardiovascular: Negative for chest pain.   Gastrointestinal: Positive for nausea and abdominal pain. Negative for vomiting.   Neurological: Negative for headaches.       Hemodynamics:  T 98 P 65 BP  RR 16 Sa02 98% RA          Physical Exam:  Physical Exam   Constitutional: He is oriented to person, place, and time. He appears well-developed. No distress.   HENT:   Head: Normocephalic and atraumatic.   Poor dentition   Eyes: EOM are normal.  Pupils are equal, round, and reactive to light.   Neck: Neck supple.   Cardiovascular: Normal rate and regular rhythm.    Pulmonary/Chest: Effort normal and breath sounds normal. No respiratory distress. He has no wheezes. He has no rales.   Abdominal: Soft. He exhibits no distension. There is tenderness.   Drains - removed   Musculoskeletal: He exhibits no edema.   Neurological: He is alert and oriented to person, place, and time.   Skin: No rash noted.   Extensive tattoos       Labs:  Recent Labs      01/13/17   0020  01/14/17   0230   WBC  6.7  8.3   RBC  4.22*  4.42*   HEMOGLOBIN  11.3*  11.7*   HEMATOCRIT  33.8*  34.9*   MCV  80.1*  79.0*   MCH  26.8*  26.5*   RDW  38.6  39.8   PLATELETCT  247  268   MPV  9.7  10.2   NEUTSPOLYS  59.70  65.60   LYMPHOCYTES  24.90  21.50*   MONOCYTES  10.50  9.20   EOSINOPHILS  3.10  2.20   BASOPHILS  0.60  0.50     Recent Labs      01/13/17   0020  01/14/17   0230   SODIUM  139  136   POTASSIUM  3.7  3.9   CHLORIDE  104  101   CO2  26  26   GLUCOSE  103*  239*   BUN  16  17     Recent Labs      01/13/17   0020  01/14/17   0230   ALBUMIN  2.5*  2.8*   TBILIRUBIN  0.6  0.4   ALKPHOSPHAT  58  59   TOTPROTEIN  6.1  6.9   ALTSGPT  26  38   ASTSGOT  41  47*   CREATININE  1.59*  1.45*       Wound:      Results     ** No results found for the last 168 hours. **        Peritoneal fluid [place]  Fluids:  Intake/Output     None             Assessment:  •  *Perihepatic fluid collection [K76.89]    •  KALEB (acute kidney injury) (HCC) [N17.9]    •  Leukocytosis [D72.829]    •  Anemia [D64.9]    •  Thrombocytosis (HCC) [D47.3]    •  Elevated alkaline phosphatase level [R74.8]    •  History of hepatitis C [Z86.19]              Plan:  26-year-old male who is status post laparoscopic cholecystectomy with postoperative complications including biliary leak, Candida albicans abscess, and exudative pleural effusion    Afebrile  Leukocytosis - resolved  HIV neg  Acute renal failure likely due to  vancomycin. Cr improving. 1.45  1/2 Repeat CT - minimal right perihepatic fluid  Continue unasyn and fluc  Stop date 1/15/17.   Appreciate Renal eval  C diff - neg  Advance diet as tolerated  DW IM

## 2017-01-14 NOTE — PROGRESS NOTES
"Hospital Medicine Progress Note, Adult, Complex               Author: Nikki Davis Date & Time created: 1/13/2017  4:35 PM     Interval History:  The patient is a very pleasant 26-year-old male who had cholecystitis and underwent a cholecystectomy at the beginning of December.  He initially felt well following this procedure, but then on around 12/12/2016, began having progressive pain in his RUQ, pain was described as deep breathing towards his right shoulder.  He has a history of chronic hepatitis C.  He was found to have a biliary leak and underwent an ERCP with sphincterotomy and biliary stent placement.  He had significant abdominal pain infectious and secondary peritonitis, was subsequently discharged to [facility] for ongoing care.  He was at [facility] for a period of time.  Several days after his transfer to [facility], he was found to have a biloma on CT imaging and underwent a percutaneous drainage by IR.  He then returned to [facility] following this procedure, he continued to be afebrile, was placed on empiric antimicrobial therapy which included Zosyn and vancomycin at that time.  Subsequent drainage cultures demonstrated yeast and was then placed on fluconazole.  Several days after the procedure, the drainage became more serosanguineous.  Followup CT scan demonstrated perihepatic fluid collection, including one in Morison pouch.  Further imaging demonstrated large right pleural effusion as well as new bilateral   hydronephrosis.  He underwent drainage of the perihepatic fluid and right-sided thoracentesis and transferred back to [facility] for ongoing management of these interventional procedures.     The patient tells me he has a remote history of acute renal failure on around the time that he was diagnosed with hepatitis C.  He was told that his kidney numbers \"went up\" the day he was treated as supportively and they improved.   As far he notes they improved to complete recovery following that " incident.     Imaging studies that I could review find that he had a CT scan of the abdomen and pelvis without contrast on 12/13/2016.  CT scan of the abdomen and pelvis with contrast on 12/14/2016.  Additionally, another CT scan of the abdomen and pelvis without contrast on December 22 as well as December 26.   His last contrast exposure was 12/14/2016.  Review his labs finds that his serum creatinine had remained in a relatively normal range under 1.0 during most of this time.  On 12/31/2016, his serum creatinine figueroa to 1.8 and subsequently to 3.69 today.  He is documented to have 1.6 L of urine output today.  He has received no other imaging studies that I could find.  His blood pressure has been relatively stable in the 120-140 range systolic.  There are no urine studies to review.  He has, however, been receiving vancomycin for a prolonged period of time.  His previous levels have remained in the appropriate therapeutic range 13-16, however, most recently it is shot up to 60.3.     DAILY NEPHROLOGY SUMMARY:              1/01/17 - Consult done              1/02/17 - No overnight events.  Off vanc.  On rocephin.                1/03/17 - No events, pt complaining n/v and diarrhea since last night, rate of rise of Cr slowing down and may be plateauing, pt reports good UOP, BP stable, no SOB or LE edema              1/04/17 - No events, Cr improved today, good UOP, BP stable, still with n/v off/on overnight and pt still reports diarrhea yest and this am 1              1/05/17 - No events, Cr trending down, good UOP, per-hepatic drain removed yest, BP stable, good UOP, loose BM overnight but none so far this am, still reports abd tenderness                1/06/17 - No events, still with nausea and episode of emesis last night, still with diarrhea and C-Diff negative, abd pain unchanged, leukocytosis improved and afebrile, Cr trending down, good UOP  "   ----------------------------------------------------------------------------------------------------------------------------              1/06/17 - Transferred to [facility]              1/07/17 - No new events. Still with N/V/D. Not on IVFs. UOP good.                 1/08/17 - Scr continues to improve. Back on IVF until taking PO well. Starting PVN. Nausea better. UOP good. Scr improving.              1/09/17 - Scr continues to improve. Still some nausea, no vomiting. Eating some but didn't eat breakfast, drinking fine. UOP good.                1/10/17 - Scr continues to improve. Started PVN. Still with nausea, not eating much. BP has been higher, started on hydralazine. HR 40's.              1/11/17 - Scr continues to improve. Still w/ nausea but no vomiting. \"Food on TV is actually starting to look good\". Now w/PICC.onPVN.                1/12/17 - No new labs. No new events. Still with nausea, no vomiting. Refused his anti-nausea this am.              1/13/17 - Scr continues to improve.  Patient denies having N/V/D today.       Review of Systems:  ROS Review of Systems   Constitutional: Negative.  Negative for fever and chills.   HENT: Negative.    Eyes: Negative.    Respiratory: Negative.    Cardiovascular: Negative.    Gastrointestinal: Negative for N/V/D.   Genitourinary: Negative.    Musculoskeletal: Positive for myalgias. Negative for back pain and joint pain.   Skin: Negative.  Negative for itching and rash.   Neurological: Negative.      Physical Exam:  Physical Exam Constitutional: He is oriented to person, place, and time. He appears well-developed and well-nourished.   HENT:    Head: Normocephalic and atraumatic.   Eyes: Conjunctivae and EOM are normal. Pupils are equal, round, and reactive to light.   Neck: Normal range of motion.   Cardiovascular: Normal rate, regular rhythm and normal heart sounds.    Pulmonary/Chest: Effort normal and breath sounds normal. No respiratory distress. He has " no wheezes.   Abdominal: Bowel sounds are normal. There is tenderness.   Musculoskeletal: Normal range of motion. He exhibits no edema.   Neurological: He is alert and oriented to person, place, and time.   Skin: Skin is warm and dry.     Labs:        Invalid input(s): [id number]      Recent Labs      01/11/17 0051 01/13/17   0020   SODIUM  139  139   POTASSIUM  3.6  3.7   CHLORIDE  104  104   CO2  24  26   BUN  16  16   CREATININE  1.89*  1.59*   MAGNESIUM   --   1.5   PHOSPHORUS   --   4.3   CALCIUM  8.4  8.4     Recent Labs      01/11/17 0051 01/13/17   0020   ALTSGPT  12  26   ASTSGOT  17  41   ALKPHOSPHAT  53  58   TBILIRUBIN  0.4  0.6   GLUCOSE  110*  103*     Recent Labs      01/13/17   0020   RBC  4.22*   HEMOGLOBIN  11.3*   HEMATOCRIT  33.8*   PLATELETCT  247     Recent Labs      01/11/17 0051 01/13/17   0020   WBC   --   6.7   NEUTSPOLYS   --   59.70   LYMPHOCYTES   --   24.90   MONOCYTES   --   10.50   EOSINOPHILS   --   3.10   BASOPHILS   --   0.60   ASTSGOT  17  41   ALTSGPT  12  26   ALKPHOSPHAT  53  58   TBILIRUBIN  0.4  0.6           Hemodynamics:  No data recorded.     No Data Recorded         Respiratory:                Fluids:  No intake or output data in the 24 hours ending 01/13/17 1635     GI/Nutrition:  Orders Placed This Encounter   Procedures   • DIET ORDER     Standing Status: Standing      Number of Occurrences: 1      Standing Expiration Date:      Order Specific Question:  Diet:     Answer:  Regular [1]     Order Specific Question:  Diet:     Answer:  Low Fiber(GI Soft) [2]     Medical Decision Making, by Problem:  There are no hospital problems to display for this patient.              1.  Acute kidney injury, nonoliguric.                             - Suspect secondary to vancomycin toxicity                          - [place] UA - does NOT suggest extension to ATN - evaluation points to AIN from Vanc.                            - There was a recent history of bilateral  hydronephrosis, but that looks to have recovered by recent imaging                             - Last contrast exposure was 12/14/2016.                             - Cr plateaued at 4.6 and now continues to trending down                          - No immediate need for dialysis              2.  History of acute liver failure in the past.                             - He thinks it was associated with hepatitis C.              3.  Hepatitis C              4.  Biliary leak,                            - Status post biliary stent with perihepatic fluid collection and moderate to large right pleural effusion                          - Status post drains, removed 1/4/17                          - Fungal infection as described above.                          - ID following with ABX adjusted              5.  Significant protein calorie malnourishment                          - Starting to take PO better (fluids not so much food)                          - Starting PVN              6.  Anemia                          - No need for CHRISTIE with KALEB                          - Iron stores low but will hold off on IV iron given active infection.                          - Transfuse PRN              7. Renal Osteodystrophy                          - PTH = 17                          - Vit D = 30              8. N/V/Diarrhea                          - C-Diff studies negative                          - Amylase/Lipase wnl                            - Not taking PO well, on parenteral nutrition              9. Hypomagnesemia                         - Magnesium added to parenteral nutrition per primary care team.  SUGGESTIONS:              --No immediate need for dialysis but will eval daily.              --Labs in AM              --Off vancomycin, continue abx per ID recs              --Hold off on IV iron due to active infection              --Continue PVN              --Monitor BP              --Dose all medications for GFR less than  30.    Core Measures

## 2017-01-14 NOTE — PROGRESS NOTES
"Hospital Medicine Progress Note, Adult, Complex               Author: Nikki Davis Date & Time created: 1/14/2017  1:16 PM     Interval History:  The patient is a very pleasant 26-year-old male who had cholecystitis and underwent a cholecystectomy at the beginning of December.  He initially felt well following this procedure, but then on around 12/12/2016, began having progressive pain in his RUQ, pain was described as deep breathing towards his right shoulder.  He has a history of chronic hepatitis C.  He was found to have a biliary leak and underwent an ERCP with sphincterotomy and biliary stent placement.  He had significant abdominal pain infectious and secondary peritonitis, was subsequently discharged to [facility] for ongoing care.  He was at [facility] for a period of time.  Several days after his transfer to [facility], he was found to have a biloma on CT imaging and underwent a percutaneous drainage by IR.  He then returned to [facility] following this procedure, he continued to be afebrile, was placed on empiric antimicrobial therapy which included Zosyn and vancomycin at that time.  Subsequent drainage cultures demonstrated yeast and was then placed on fluconazole.  Several days after the procedure, the drainage became more serosanguineous.  Followup CT scan demonstrated perihepatic fluid collection, including one in Morison pouch.  Further imaging demonstrated large right pleural effusion as well as new bilateral   hydronephrosis.  He underwent drainage of the perihepatic fluid and right-sided thoracentesis and transferred back to [facility] for ongoing management of these interventional procedures.     The patient tells me he has a remote history of acute renal failure on around the time that he was diagnosed with hepatitis C.  He was told that his kidney numbers \"went up\" the day he was treated as supportively and they improved.   As far he notes they improved to complete recovery following that " incident.     Imaging studies that I could review find that he had a CT scan of the abdomen and pelvis without contrast on 12/13/2016.  CT scan of the abdomen and pelvis with contrast on 12/14/2016.  Additionally, another CT scan of the abdomen and pelvis without contrast on December 22 as well as December 26.   His last contrast exposure was 12/14/2016.  Review his labs finds that his serum creatinine had remained in a relatively normal range under 1.0 during most of this time.  On 12/31/2016, his serum creatinine figueroa to 1.8 and subsequently to 3.69 today.  He is documented to have 1.6 L of urine output today.  He has received no other imaging studies that I could find.  His blood pressure has been relatively stable in the 120-140 range systolic.  There are no urine studies to review.  He has, however, been receiving vancomycin for a prolonged period of time.  His previous levels have remained in the appropriate therapeutic range 13-16, however, most recently it is shot up to 60.3.     DAILY NEPHROLOGY SUMMARY:              1/01/17 - Consult done              1/02/17 - No overnight events.  Off vanc.  On rocephin.                1/03/17 - No events, pt complaining n/v and diarrhea since last night, rate of rise of Cr slowing down and may be plateauing, pt reports good UOP, BP stable, no SOB or LE edema              1/04/17 - No events, Cr improved today, good UOP, BP stable, still with n/v off/on overnight and pt still reports diarrhea yest and this am 1              1/05/17 - No events, Cr trending down, good UOP, per-hepatic drain removed yest, BP stable, good UOP, loose BM overnight but none so far this am, still reports abd tenderness                1/06/17 - No events, still with nausea and episode of emesis last night, still with diarrhea and C-Diff negative, abd pain unchanged, leukocytosis improved and afebrile, Cr trending down, good UOP  "   ----------------------------------------------------------------------------------------------------------------------------              1/06/17 - Transferred to [facility]              1/07/17 - No new events. Still with N/V/D. Not on IVFs. UOP good.                 1/08/17 - Scr continues to improve. Back on IVF until taking PO well. Starting PVN. Nausea better. UOP good. Scr improving.              1/09/17 - Scr continues to improve. Still some nausea, no vomiting. Eating some but didn't eat breakfast, drinking fine. UOP good.                1/10/17 - Scr continues to improve. Started PVN. Still with nausea, not eating much. BP has been higher, started on hydralazine. HR 40's.              1/11/17 - Scr continues to improve. Still w/ nausea but no vomiting. \"Food on TV is actually starting to look good\". Now w/PICC.onPVN.                1/12/17 - No new labs. No new events. Still with nausea, no vomiting. Refused his anti-nausea this am.              1/13/17 - Scr continues to improve.  Patient denies having N/V/D today.               1/14/17 - Scr continues to improve. Magnesium improved. Still has poor appetite.       Review of Systems:  ROS ROS Review of Systems    Constitutional: Negative.  Negative for fever and chills.    HENT: Negative.     Eyes: Negative.     Respiratory: Negative.     Cardiovascular: Negative.     Gastrointestinal: Negative for N/V/D.    Genitourinary: Negative.     Musculoskeletal: Positive for myalgias. Negative for back pain and joint pain.    Skin: Negative.  Negative for itching and rash.   Neurological: Negative    Physical Exam:  Physical Exam Physical Exam Constitutional: He is oriented to person, place, and time. He appears well-developed and well-nourished.    HENT:    Head: Normocephalic and atraumatic.    Eyes: Conjunctivae and EOM are normal. Pupils are equal, round, and reactive to light.    Neck: Normal range of motion.    Cardiovascular: Normal rate, regular " rhythm and normal heart sounds.     Pulmonary/Chest: Effort normal and breath sounds normal. No respiratory distress. He has no wheezes.    Abdominal: Bowel sounds are normal. There is tenderness.   Musculoskeletal: Normal range of motion. He exhibits no edema.   Neurological: He is alert and oriented to person, place, and time.   Skin: Skin is warm and dry.      Labs:        Invalid input(s): [id number]      Recent Labs      01/13/17 0020  01/14/17   0230   SODIUM  139  136   POTASSIUM  3.7  3.9   CHLORIDE  104  101   CO2  26  26   BUN  16  17   CREATININE  1.59*  1.45*   MAGNESIUM  1.5  2.1   PHOSPHORUS  4.3  3.7   CALCIUM  8.4  8.7     Recent Labs      01/13/17 0020  01/14/17   0230   ALTSGPT  26  38   ASTSGOT  41  47*   ALKPHOSPHAT  58  59   TBILIRUBIN  0.6  0.4   GLUCOSE  103*  239*     Recent Labs      01/13/17 0020 01/14/17   0230   RBC  4.22*  4.42*   HEMOGLOBIN  11.3*  11.7*   HEMATOCRIT  33.8*  34.9*   PLATELETCT  247  268     Recent Labs      01/13/17 0020  01/14/17   0230   WBC  6.7  8.3   NEUTSPOLYS  59.70  65.60   LYMPHOCYTES  24.90  21.50*   MONOCYTES  10.50  9.20   EOSINOPHILS  3.10  2.20   BASOPHILS  0.60  0.50   ASTSGOT  41  47*   ALTSGPT  26  38   ALKPHOSPHAT  58  59   TBILIRUBIN  0.6  0.4           Hemodynamics:  No data recorded.     No Data Recorded         Respiratory:                Fluids:  No intake or output data in the 24 hours ending 01/14/17 1316     GI/Nutrition:  Orders Placed This Encounter   Procedures   • DIET ORDER     Standing Status: Standing      Number of Occurrences: 1      Standing Expiration Date:      Order Specific Question:  Diet:     Answer:  Regular [1]     Order Specific Question:  Diet:     Answer:  Low Fiber(GI Soft) [2]     Medical Decision Making, by Problem:  There are no hospital problems to display for this patient.  1.  Acute kidney injury, nonoliguric.                             - Suspect secondary to vancomycin  toxicity                          - [place] [facility] - does NOT suggest extension to ATN - evaluation points to AIN from Vanc.                            - There was a recent history of bilateral hydronephrosis, but that looks to have recovered by recent imaging                             - Last contrast exposure was 12/14/2016.                             - Cr plateaued at 4.6 and now continues to trending down                          - No immediate need for dialysis              2.  History of acute liver failure in the past.                             - He thinks it was associated with hepatitis C.              3.  Hepatitis C              4.  Biliary leak,                            - Status post biliary stent with perihepatic fluid collection and moderate to large right pleural effusion                          - Status post drains, removed 1/4/17                          - Fungal infection as described above.                          - ID following with ABX adjusted              5.  Significant protein calorie malnourishment                          - Was starting to take PO better (fluids not so much food), but appetite is worse currently.                          - Starting PVN              6.  Anemia                          - No need for CHRISTIE with KALEB                          - Iron stores low but will hold off on IV iron given active infection.                          - Transfuse PRN              7. Renal Osteodystrophy                          - PTH = 17                          - Vit D = 30              8. N/V/Diarrhea                          - C-Diff studies negative                          - Amylase/Lipase wnl                            - Not taking PO well, on parenteral nutrition              9. Hypomagnesemia                         - Magnesium added to parenteral nutrition per primary care team.  SUGGESTIONS:              --No immediate need for dialysis but will eval daily.              --Labs in  AM              --Off vancomycin, continue abx per ID recs              --Hold off on IV iron due to active infection              --Continue PVN              --Monitor BP              --Dose all medications for GFR less than 30.        Core Measures

## 2017-01-15 LAB
ALBUMIN SERPL BCP-MCNC: 2.8 G/DL (ref 3.2–4.9)
BUN SERPL-MCNC: 18 MG/DL (ref 8–22)
CALCIUM SERPL-MCNC: 8.8 MG/DL (ref 8.4–10.2)
CHLORIDE SERPL-SCNC: 105 MMOL/L (ref 96–112)
CO2 SERPL-SCNC: 26 MMOL/L (ref 20–33)
CREAT SERPL-MCNC: 1.2 MG/DL (ref 0.5–1.4)
GFR SERPL CREATININE-BSD FRML MDRD: >60 ML/MIN/1.73 M 2
GLUCOSE BLD-MCNC: 111 MG/DL (ref 65–99)
GLUCOSE BLD-MCNC: 94 MG/DL (ref 65–99)
GLUCOSE SERPL-MCNC: 111 MG/DL (ref 65–99)
MAGNESIUM SERPL-MCNC: 1.8 MG/DL (ref 1.5–2.5)
PHOSPHATE SERPL-MCNC: 3.7 MG/DL (ref 2.5–4.5)
POTASSIUM SERPL-SCNC: 3.9 MMOL/L (ref 3.6–5.5)
SODIUM SERPL-SCNC: 136 MMOL/L (ref 135–145)

## 2017-01-15 PROCEDURE — 83735 ASSAY OF MAGNESIUM: CPT

## 2017-01-15 PROCEDURE — 80069 RENAL FUNCTION PANEL: CPT

## 2017-01-15 PROCEDURE — 82962 GLUCOSE BLOOD TEST: CPT

## 2017-01-15 PROCEDURE — 99232 SBSQ HOSP IP/OBS MODERATE 35: CPT | Performed by: HOSPITALIST

## 2017-01-15 ASSESSMENT — ENCOUNTER SYMPTOMS
EYES NEGATIVE: 1
ABDOMINAL PAIN: 1
COUGH: 0
NAUSEA: 1
VOMITING: 0
SHORTNESS OF BREATH: 0
FEVER: 0
MUSCULOSKELETAL NEGATIVE: 1
PALPITATIONS: 0
CHILLS: 0

## 2017-01-15 NOTE — PROGRESS NOTES
"[facility] Nephrology Progress Note, Adult, Complex               Author: Nikki Davis Date & Time created: 1/15/2017  11:52 AM     Interval History:  The patient is a very pleasant 26-year-old male who had cholecystitis and underwent a cholecystectomy at the beginning of December.  He initially felt well following this procedure, but then on around 12/12/2016, began having progressive pain in his RUQ, pain was described as deep breathing towards his right shoulder.  He has a history of chronic hepatitis C.  He was found to have a biliary leak and underwent an ERCP with sphincterotomy and biliary stent placement.  He had significant abdominal pain infectious and secondary peritonitis, was subsequently discharged to [facility] for ongoing care.  He was at [facility] for a period of time.  Several days after his transfer to [facility], he was found to have a biloma on CT imaging and underwent a percutaneous drainage by IR.  He then returned to [facility] following this procedure, he continued to be afebrile, was placed on empiric antimicrobial therapy which included Zosyn and vancomycin at that time.  Subsequent drainage cultures demonstrated yeast and was then placed on fluconazole.  Several days after the procedure, the drainage became more serosanguineous.  Followup CT scan demonstrated perihepatic fluid collection, including one in Morison pouch.  Further imaging demonstrated large right pleural effusion as well as new bilateral   hydronephrosis.  He underwent drainage of the perihepatic fluid and right-sided thoracentesis and transferred back to [facility] for ongoing management of these interventional procedures.     The patient tells me he has a remote history of acute renal failure on around the time that he was diagnosed with hepatitis C.  He was told that his kidney numbers \"went up\" the day he was treated as supportively and they improved.   As far he notes they improved to complete recovery following that " incident.     Imaging studies that I could review find that he had a CT scan of the abdomen and pelvis without contrast on 12/13/2016.  CT scan of the abdomen and pelvis with contrast on 12/14/2016.  Additionally, another CT scan of the abdomen and pelvis without contrast on December 22 as well as December 26.   His last contrast exposure was 12/14/2016.  Review his labs finds that his serum creatinine had remained in a relatively normal range under 1.0 during most of this time.  On 12/31/2016, his serum creatinine figueroa to 1.8 and subsequently to 3.69 today.  He is documented to have 1.6 L of urine output today.  He has received no other imaging studies that I could find.  His blood pressure has been relatively stable in the 120-140 range systolic.  There are no urine studies to review.  He has, however, been receiving vancomycin for a prolonged period of time.  His previous levels have remained in the appropriate therapeutic range 13-16, however, most recently it is shot up to 60.3.     DAILY NEPHROLOGY SUMMARY:              1/01/17 - Consult done              1/02/17 - No overnight events.  Off vanc.  On rocephin.                1/03/17 - No events, pt complaining n/v and diarrhea since last night, rate of rise of Cr slowing down and may be plateauing, pt reports good UOP, BP stable, no SOB or LE edema              1/04/17 - No events, Cr improved today, good UOP, BP stable, still with n/v off/on overnight and pt still reports diarrhea yest and this am 1              1/05/17 - No events, Cr trending down, good UOP, per-hepatic drain removed yest, BP stable, good UOP, loose BM overnight but none so far this am, still reports abd tenderness                1/06/17 - No events, still with nausea and episode of emesis last night, still with diarrhea and C-Diff negative, abd pain unchanged, leukocytosis improved and afebrile, Cr trending down, good UOP  "   ----------------------------------------------------------------------------------------------------------------------------              1/06/17 - Transferred to [facility]              1/07/17 - No new events. Still with N/V/D. Not on IVFs. UOP good.                 1/08/17 - Scr continues to improve. Back on IVF until taking PO well. Starting PVN. Nausea better. UOP good. Scr improving.              1/09/17 - Scr continues to improve. Still some nausea, no vomiting. Eating some but didn't eat breakfast, drinking fine. UOP good.                1/10/17 - Scr continues to improve. Started PVN. Still with nausea, not eating much. BP has been higher, started on hydralazine. HR 40's.              1/11/17 - Scr continues to improve. Still w/ nausea but no vomiting. \"Food on TV is actually starting to look good\". Now w/PICC.onPVN.                1/12/17 - No new labs. No new events. Still with nausea, no vomiting. Refused his anti-nausea this am.              1/13/17 - Scr continues to improve.  Patient denies having N/V/D today.                1/14/17 - Scr continues to improve. Magnesium improved. Still has poor appetite.               1/15/17 - Scr continues to improve.  Magnesium stable.  Still has poor appetite.  Pt still on Lasix per primary team for recurrent Rt. Pleural effusion      Review of Systems:  ROS Constitutional: Negative.  Negative for fever and chills.    HENT: Negative.     Eyes: Negative.     Respiratory: Negative.     Cardiovascular: Negative.     Gastrointestinal: Negative for N/V/D.    Genitourinary: Negative.     Musculoskeletal: Positive for myalgias. Negative for back pain and joint pain.    Skin: Negative.  Negative for itching and rash.   Neurological: Negative    Physical Exam:  Physical Exam Physical Exam Physical Exam Constitutional: He is oriented to person, place, and time. He appears well-developed and well-nourished.    HENT:    Head: Normocephalic and atraumatic.    Eyes: " Conjunctivae and EOM are normal. Pupils are equal, round, and reactive to light.    Neck: Normal range of motion.    Cardiovascular: Normal rate, regular rhythm and normal heart sounds.     Pulmonary/Chest: Effort normal and breath sounds normal. No respiratory distress. He has no wheezes.    Abdominal: Bowel sounds are normal. There is tenderness.   Musculoskeletal: Normal range of motion. He exhibits no edema.   Neurological: He is alert and oriented to person, place, and time.   Skin: Skin is warm and dry.      Labs:        Invalid input(s): [id number]      Recent Labs      01/13/17   0020  01/14/17   0230  01/15/17   0300   SODIUM  139  136  136   POTASSIUM  3.7  3.9  3.9   CHLORIDE  104  101  105   CO2  26  26  26   BUN  16  17  18   CREATININE  1.59*  1.45*  1.20   MAGNESIUM  1.5  2.1  1.8   PHOSPHORUS  4.3  3.7  3.7   CALCIUM  8.4  8.7  8.8     Recent Labs      01/13/17 0020  01/14/17 0230  01/15/17   0300   ALTSGPT  26  38   --    ASTSGOT  41  47*   --    ALKPHOSPHAT  58  59   --    TBILIRUBIN  0.6  0.4   --    GLUCOSE  103*  239*  111*     Recent Labs      01/13/17 0020  01/14/17   0230   RBC  4.22*  4.42*   HEMOGLOBIN  11.3*  11.7*   HEMATOCRIT  33.8*  34.9*   PLATELETCT  247  268     Recent Labs      01/13/17   0020  01/14/17   0230   WBC  6.7  8.3   NEUTSPOLYS  59.70  65.60   LYMPHOCYTES  24.90  21.50*   MONOCYTES  10.50  9.20   EOSINOPHILS  3.10  2.20   BASOPHILS  0.60  0.50   ASTSGOT  41  47*   ALTSGPT  26  38   ALKPHOSPHAT  58  59   TBILIRUBIN  0.6  0.4           Hemodynamics:  No data recorded.     No Data Recorded         Respiratory:                Fluids:  No intake or output data in the 24 hours ending 01/15/17 1152     GI/Nutrition:  Orders Placed This Encounter   Procedures   • DIET ORDER     Standing Status: Standing      Number of Occurrences: 1      Standing Expiration Date:      Order Specific Question:  Diet:     Answer:  Regular [1]     Order Specific Question:  Diet:      Answer:  Low Fiber(GI Soft) [2]     Medical Decision Making, by Problem:  There are no hospital problems to display for this patient.  1.  Acute kidney injury, nonoliguric.                             - Suspect secondary to vancomycin toxicity                          - [place] [facility] - does NOT suggest extension to ATN - evaluation points to AIN from Vanc.                            - There was a recent history of bilateral hydronephrosis, but that looks to have recovered by recent imaging                             - Last contrast exposure was 12/14/2016.                             - Cr plateaued at 4.6 and now continues to trending down                          - No immediate need for dialysis              2.  History of acute liver failure in the past.                             - He thinks it was associated with hepatitis C.              3.  Hepatitis C              4.  Biliary leak,                            - Status post biliary stent with perihepatic fluid collection and moderate to large right pleural effusion                          - Status post drains, removed 1/4/17                          - Fungal infection as described above.                          - ID following with ABX adjusted              5.  Significant protein calorie malnourishment                          - Was starting to take PO better (fluids not so much food), but appetite is worse currently.                          - Starting PVN              6.  Anemia                          - No need for CHRISTIE with KALEB                          - Iron stores low but will hold off on IV iron given active infection.                          - Transfuse PRN              7. Renal Osteodystrophy                          - PTH = 17                          - Vit D = 30              8. N/V/Diarrhea                          - C-Diff studies negative                          - Amylase/Lipase wnl                            - Not taking PO well, on parenteral  nutrition              9. Hypomagnesemia                         - Magnesium added to parenteral nutrition per primary care team.  SUGGESTIONS:              --No immediate need for dialysis but will eval daily.              --Labs in AM              --Off vancomycin, continue abx per ID recs              --Hold off on IV iron due to active infection              --Continue Lasix for recurrent Rt. Pleural effusion.              --Continue PVN              --Monitor BP              --Dose all medications for GFR less than 30.      Core Measures

## 2017-01-15 NOTE — [facility]
Hospital Medicine Progress Note, Adult, Complex               Author: Keisha Geraldine Date & Time created: 1/15/2017  12:21 PM     Interval History:  CC - infected biloma  Doing fine, ambulates to bathroom per Guards.    Review of Systems:  Review of Systems   Constitutional: Negative for fever and chills.   HENT: Negative.    Eyes: Negative.    Respiratory: Negative for cough and shortness of breath.    Cardiovascular: Negative for chest pain and palpitations.   Gastrointestinal: Positive for nausea and abdominal pain. Negative for vomiting.   Genitourinary: Negative.    Musculoskeletal: Negative.    Skin: Negative for itching and rash.   Endo/Heme/Allergies: Negative.        Physical Exam:  Physical Exam   Constitutional: He is oriented to person, place, and time. He appears well-developed and well-nourished. No distress.   HENT:   Head: Normocephalic and atraumatic.   Right Ear: External ear normal.   Left Ear: External ear normal.   Nose: Nose normal.   Mouth/Throat: Oropharynx is clear and moist. No oropharyngeal exudate.   Eyes: Conjunctivae and EOM are normal. Right eye exhibits no discharge. Left eye exhibits no discharge. No scleral icterus.   Neck: Normal range of motion. Neck supple. No tracheal deviation present.   Cardiovascular: Normal rate and regular rhythm.    SR   Pulmonary/Chest: Effort normal and breath sounds normal. No stridor. No respiratory distress. He has no wheezes.   Abdominal: Soft. Bowel sounds are normal. He exhibits no distension. There is no tenderness. There is no rebound and no guarding.   Musculoskeletal: He exhibits no edema or tenderness.   Neurological: He is alert and oriented to person, place, and time.   Skin: Skin is warm and dry. He is not diaphoretic.   Vitals reviewed.      Labs:        Invalid input(s): [id number]      Recent Labs      01/13/17   0020  01/14/17   0230  01/15/17   0300   SODIUM  139  136  136   POTASSIUM  3.7  3.9  3.9   CHLORIDE  104  101  105   CO2  26  26   26   BUN  16  17  18   CREATININE  1.59*  1.45*  1.20   MAGNESIUM  1.5  2.1  1.8   PHOSPHORUS  4.3  3.7  3.7   CALCIUM  8.4  8.7  8.8     Recent Labs      01/13/17   0020  01/14/17   0230  01/15/17   0300   ALTSGPT  26  38   --    ASTSGOT  41  47*   --    ALKPHOSPHAT  58  59   --    TBILIRUBIN  0.6  0.4   --    GLUCOSE  103*  239*  111*     Recent Labs      01/13/17   0020  01/14/17   0230   RBC  4.22*  4.42*   HEMOGLOBIN  11.3*  11.7*   HEMATOCRIT  33.8*  34.9*   PLATELETCT  247  268     Recent Labs      01/13/17   0020  01/14/17   0230   WBC  6.7  8.3   NEUTSPOLYS  59.70  65.60   LYMPHOCYTES  24.90  21.50*   MONOCYTES  10.50  9.20   EOSINOPHILS  3.10  2.20   BASOPHILS  0.60  0.50   ASTSGOT  41  47*   ALTSGPT  26  38   ALKPHOSPHAT  58  59   TBILIRUBIN  0.6  0.4             Medical Decision Making, by Problem:  S/P RECENT CHOLECYSTECTOMY  BILE LEAK S/P BILIARY STENT  YEAST INFECTED BILOMA S/P IR DRAINAGE - cont Fluconazole/Unasyn through 1/15/17 per ID, Ultram for pain control  S/P RIGHT THORACENTESIS - F/U CXR shows recurrence of pleural effusion, so started on Lasix w/ good diuresis  S/P BILAT HYDRONEPHROSIS  HTN - BP controlled w/ Hydralazine  ASYMPTOMATIC BRADYCARDIA - no more bradycardia on Hydralazine  KALEB - 2/2 Vanco, resolving, Nephro following  ANEMIA - follow H/H, Fe/Vit C  HYPOMAGNESEMIA - repleted  HCV  CONSTIPATION - Docusate and Senna, may need to add Miralax  SPCM - PO and TPN for nutritional support  DEBILITY - PT/OT    Reviewed w/ pt and Guards        Medications reviewed and Labs reviewed  [place] catheter: No [place]  Central line in place: Need for access    DVT Prophylaxis: Heparin    Ulcer prophylaxis: Yes  Antibiotics: Treating active infection/contamination beyond 24 hours perioperative coverage

## 2017-01-15 NOTE — [facility]
Hospital Medicine Progress Note, Adult, Complex               Author: Keisha Chandler Date & Time created: 1/14/2017  7:03 PM     Interval History:  CC - infected biloma  No new complaints, watching TV.    Review of Systems:  Review of Systems   Constitutional: Negative for fever and chills.   HENT: Negative.    Eyes: Negative.    Respiratory: Negative for cough and shortness of breath.    Cardiovascular: Negative for chest pain and palpitations.   Gastrointestinal: Positive for nausea and abdominal pain. Negative for vomiting.   Genitourinary: Negative.    Musculoskeletal: Negative.    Skin: Negative for itching and rash.   Endo/Heme/Allergies: Negative.        Physical Exam:  Physical Exam   Constitutional: He is oriented to person, place, and time. He appears well-developed and well-nourished. No distress.   HENT:   Head: Normocephalic and atraumatic.   Right Ear: External ear normal.   Left Ear: External ear normal.   Nose: Nose normal.   Mouth/Throat: Oropharynx is clear and moist. No oropharyngeal exudate.   Eyes: Conjunctivae and EOM are normal. Right eye exhibits no discharge. Left eye exhibits no discharge. No scleral icterus.   Neck: Normal range of motion. Neck supple. No tracheal deviation present.   Cardiovascular: Normal rate and regular rhythm.    SR   Pulmonary/Chest: Effort normal and breath sounds normal. No stridor. No respiratory distress. He has no wheezes.   Abdominal: Soft. Bowel sounds are normal. He exhibits no distension. There is no tenderness. There is no rebound and no guarding.   Musculoskeletal: He exhibits no edema or tenderness.   Neurological: He is alert and oriented to person, place, and time.   Skin: Skin is warm and dry. He is not diaphoretic.   Vitals reviewed.      Labs:        Invalid input(s): [id number]      Recent Labs      01/13/17   0020  01/14/17   0230   SODIUM  139  136   POTASSIUM  3.7  3.9   CHLORIDE  104  101   CO2  26  26   BUN  16  17   CREATININE  1.59*  1.45*    MAGNESIUM  1.5  2.1   PHOSPHORUS  4.3  3.7   CALCIUM  8.4  8.7     Recent Labs      01/13/17   0020  01/14/17   0230   ALTSGPT  26  38   ASTSGOT  41  47*   ALKPHOSPHAT  58  59   TBILIRUBIN  0.6  0.4   GLUCOSE  103*  239*     Recent Labs      01/13/17   0020  01/14/17   0230   RBC  4.22*  4.42*   HEMOGLOBIN  11.3*  11.7*   HEMATOCRIT  33.8*  34.9*   PLATELETCT  247  268     Recent Labs      01/13/17   0020  01/14/17   0230   WBC  6.7  8.3   NEUTSPOLYS  59.70  65.60   LYMPHOCYTES  24.90  21.50*   MONOCYTES  10.50  9.20   EOSINOPHILS  3.10  2.20   BASOPHILS  0.60  0.50   ASTSGOT  41  47*   ALTSGPT  26  38   ALKPHOSPHAT  58  59   TBILIRUBIN  0.6  0.4             Medical Decision Making, by Problem:  S/P RECENT CHOLECYSTECTOMY  BILE LEAK S/P BILIARY STENT  YEAST INFECTED BILOMA S/P IR DRAINAGE - cont Fluconazole/Unasyn through 1/15/17 per ID, trial Ultram for pain control  S/P RIGHT THORACENTESIS - F/U CXR shows recurrence of pleural effusion, so started on Lasix w/ good diuresis  S/P BILAT HYDRONEPHROSIS  HTN - BP controlled w/ Hydralazine  ASYMPTOMATIC BRADYCARDIA - HR improved w/ Hydralazine  KALEB - 2/2 Vanco, resolving, Nephro following  ANEMIA - follow H/H, Fe/Vit C  HYPOMAGNESEMIA - repleted  HCV  CONSTIPATION - Docusate and Senna  SPCM - PO and TPN for nutritional support  DEBILITY - PT/OT    Reviewed w/ pt and Guards        Medications reviewed and Labs reviewed  [place] catheter: No [place]  Central line in place: Need for access    DVT Prophylaxis: Heparin    Ulcer prophylaxis: Yes  Antibiotics: Treating active infection/contamination beyond 24 hours perioperative coverage

## 2017-01-16 LAB
ALBUMIN SERPL BCP-MCNC: 2.7 G/DL (ref 3.2–4.9)
ALBUMIN/GLOB SERPL: 0.6 G/DL
ALP SERPL-CCNC: 77 U/L (ref 30–99)
ALT SERPL-CCNC: 54 U/L (ref 2–50)
ANION GAP SERPL CALC-SCNC: 9 MMOL/L (ref 0–11.9)
AST SERPL-CCNC: 55 U/L (ref 12–45)
BASOPHILS # BLD AUTO: 0.9 % (ref 0–1.8)
BASOPHILS # BLD: 0.05 K/UL (ref 0–0.12)
BILIRUB SERPL-MCNC: 0.3 MG/DL (ref 0.1–1.5)
BUN SERPL-MCNC: 21 MG/DL (ref 8–22)
CALCIUM SERPL-MCNC: 9.1 MG/DL (ref 8.4–10.2)
CHLORIDE SERPL-SCNC: 103 MMOL/L (ref 96–112)
CO2 SERPL-SCNC: 25 MMOL/L (ref 20–33)
CREAT SERPL-MCNC: 1.18 MG/DL (ref 0.5–1.4)
EOSINOPHIL # BLD AUTO: 0.23 K/UL (ref 0–0.51)
EOSINOPHIL NFR BLD: 4 % (ref 0–6.9)
ERYTHROCYTE [DISTWIDTH] IN BLOOD BY AUTOMATED COUNT: 40.2 FL (ref 35.9–50)
GFR SERPL CREATININE-BSD FRML MDRD: >60 ML/MIN/1.73 M 2
GLOBULIN SER CALC-MCNC: 4.5 G/DL (ref 1.9–3.5)
GLUCOSE BLD-MCNC: 104 MG/DL (ref 65–99)
GLUCOSE SERPL-MCNC: 105 MG/DL (ref 65–99)
HCT VFR BLD AUTO: 34.9 % (ref 42–52)
HGB BLD-MCNC: 11.4 G/DL (ref 14–18)
IMM GRANULOCYTES # BLD AUTO: 0.07 K/UL (ref 0–0.11)
IMM GRANULOCYTES NFR BLD AUTO: 1.2 % (ref 0–0.9)
LYMPHOCYTES # BLD AUTO: 1.72 K/UL (ref 1–4.8)
LYMPHOCYTES NFR BLD: 30 % (ref 22–41)
MAGNESIUM SERPL-MCNC: 1.8 MG/DL (ref 1.5–2.5)
MCH RBC QN AUTO: 26.3 PG (ref 27–33)
MCHC RBC AUTO-ENTMCNC: 32.7 G/DL (ref 33.7–35.3)
MCV RBC AUTO: 80.6 FL (ref 81.4–97.8)
MONOCYTES # BLD AUTO: 0.51 K/UL (ref 0–0.85)
MONOCYTES NFR BLD AUTO: 8.9 % (ref 0–13.4)
NEUTROPHILS # BLD AUTO: 3.15 K/UL (ref 1.82–7.42)
NEUTROPHILS NFR BLD: 55 % (ref 44–72)
NRBC # BLD AUTO: 0 K/UL
NRBC BLD AUTO-RTO: 0 /100 WBC
PLATELET # BLD AUTO: 257 K/UL (ref 164–446)
PMV BLD AUTO: 10.2 FL (ref 9–12.9)
POTASSIUM SERPL-SCNC: 4.3 MMOL/L (ref 3.6–5.5)
PROT SERPL-MCNC: 7.2 G/DL (ref 6–8.2)
RBC # BLD AUTO: 4.33 M/UL (ref 4.7–6.1)
SODIUM SERPL-SCNC: 137 MMOL/L (ref 135–145)
WBC # BLD AUTO: 5.7 K/UL (ref 4.8–10.8)

## 2017-01-16 PROCEDURE — 85025 COMPLETE CBC W/AUTO DIFF WBC: CPT

## 2017-01-16 PROCEDURE — 82962 GLUCOSE BLOOD TEST: CPT

## 2017-01-16 PROCEDURE — 80053 COMPREHEN METABOLIC PANEL: CPT

## 2017-01-16 PROCEDURE — 83735 ASSAY OF MAGNESIUM: CPT

## 2017-01-16 PROCEDURE — 99232 SBSQ HOSP IP/OBS MODERATE 35: CPT | Performed by: HOSPITALIST

## 2017-01-16 ASSESSMENT — ENCOUNTER SYMPTOMS
MUSCULOSKELETAL NEGATIVE: 1
CHILLS: 0
EYES NEGATIVE: 1
PALPITATIONS: 0
SHORTNESS OF BREATH: 0
VOMITING: 0
HEADACHES: 0
ABDOMINAL PAIN: 1
FEVER: 0
NAUSEA: 1
COUGH: 0

## 2017-01-16 NOTE — PROGRESS NOTES
"[facility] Nephrology Progress Note, Adult, Complex               Author: Scottie Hurtado Date & Time created: 1/16/2017  11:00 AM     Interval History:  The patient is a very pleasant 26-year-old male who had cholecystitis and underwent a cholecystectomy at the beginning of December.  He initially felt well following this procedure, but then on around 12/12/2016, began having progressive pain in his RUQ, pain was described as deep breathing towards his right shoulder.  He has a history of chronic hepatitis C.  He was found to have a biliary leak and underwent an ERCP with sphincterotomy and biliary stent placement.  He had significant abdominal pain infectious and secondary peritonitis, was subsequently discharged to [facility] for ongoing care.  He was at [facility] for a period of time.  Several days after his transfer to [facility], he was found to have a biloma on CT imaging and underwent a percutaneous drainage by IR.  He then returned to [facility] following this procedure, he continued to be afebrile, was placed on empiric antimicrobial therapy which included Zosyn and vancomycin at that time.  Subsequent drainage cultures demonstrated yeast and was then placed on fluconazole.  Several days after the procedure, the drainage became more serosanguineous.  Followup CT scan demonstrated perihepatic fluid collection, including one in Morison pouch.  Further imaging demonstrated large right pleural effusion as well as new bilateral   hydronephrosis.  He underwent drainage of the perihepatic fluid and right-sided thoracentesis and transferred back to [facility] for ongoing management of these interventional procedures.     The patient tells me he has a remote history of acute renal failure on around the time that he was diagnosed with hepatitis C.  He was told that his kidney numbers \"went up\" the day he was treated as supportively and they improved.   As far he notes they improved to complete recovery following that " incident.     Imaging studies that I could review find that he had a CT scan of the abdomen and pelvis without contrast on 12/13/2016.  CT scan of the abdomen and pelvis with contrast on 12/14/2016.  Additionally, another CT scan of the abdomen and pelvis without contrast on December 22 as well as December 26.   His last contrast exposure was 12/14/2016.  Review his labs finds that his serum creatinine had remained in a relatively normal range under 1.0 during most of this time.  On 12/31/2016, his serum creatinine figueroa to 1.8 and subsequently to 3.69 today.  He is documented to have 1.6 L of urine output today.  He has received no other imaging studies that I could find.  His blood pressure has been relatively stable in the 120-140 range systolic.  There are no urine studies to review.  He has, however, been receiving vancomycin for a prolonged period of time.  His previous levels have remained in the appropriate therapeutic range 13-16, however, most recently it is shot up to 60.3.     DAILY NEPHROLOGY SUMMARY:              1/01/17 - Consult done              1/02/17 - No overnight events.  Off vanc.  On rocephin.                1/03/17 - No events, pt complaining n/v and diarrhea since last night, rate of rise of Cr slowing down and may be plateauing, pt reports good UOP, BP stable, no SOB or LE edema              1/04/17 - No events, Cr improved today, good UOP, BP stable, still with n/v off/on overnight and pt still reports diarrhea yest and this am 1              1/05/17 - No events, Cr trending down, good UOP, per-hepatic drain removed yest, BP stable, good UOP, loose BM overnight but none so far this am, still reports abd tenderness                1/06/17 - No events, still with nausea and episode of emesis last night, still with diarrhea and C-Diff negative, abd pain unchanged, leukocytosis improved and afebrile, Cr trending down, good UOP  "   ----------------------------------------------------------------------------------------------------------------------------              1/06/17 - Transferred to [facility]              1/07/17 - No new events. Still with N/V/D. Not on IVFs. UOP good.                 1/08/17 - Scr continues to improve. Back on IVF until taking PO well. Starting PVN. Nausea better. UOP good. Scr improving.              1/09/17 - Scr continues to improve. Still some nausea, no vomiting. Eating some but didn't eat breakfast, drinking fine. UOP good.                1/10/17 - Scr continues to improve. Started PVN. Still with nausea, not eating much. BP has been higher, started on hydralazine. HR 40's.              1/11/17 - Scr continues to improve. Still w/ nausea but no vomiting. \"Food on TV is actually starting to look good\". Now w/PICC.onPVN.                1/12/17 - No new labs. No new events. Still with nausea, no vomiting. Refused his anti-nausea this am.              1/13/17 - Scr continues to improve.  Patient denies having N/V/D today.                1/14/17 - Scr continues to improve. Magnesium improved. Still has poor appetite.               1/15/17 - Scr continues to improve.  Magnesium stable.  Still has poor appetite.  Pt still on Lasix per primary team for recurrent Rt. Pleural effusion      Review of Systems:  ROS Constitutional: Negative.  Negative for fever and chills.    HENT: Negative.     Eyes: Negative.     Respiratory: Negative.     Cardiovascular: Negative.     Gastrointestinal: Negative for N/V/D.    Genitourinary: Negative.     Musculoskeletal: Positive for myalgias. Negative for back pain and joint pain.    Skin: Negative.  Negative for itching and rash.   Neurological: Negative    Physical Exam:  Physical Exam Physical Exam Physical Exam Constitutional: He is oriented to person, place, and time. He appears well-developed and well-nourished.    HENT:    Head: Normocephalic and atraumatic.    Eyes: " Conjunctivae and EOM are normal. Pupils are equal, round, and reactive to light.    Neck: Normal range of motion.    Cardiovascular: Normal rate, regular rhythm and normal heart sounds.     Pulmonary/Chest: Effort normal and breath sounds normal. No respiratory distress. He has no wheezes.    Abdominal: Bowel sounds are normal. There is tenderness.   Musculoskeletal: Normal range of motion. He exhibits no edema.   Neurological: He is alert and oriented to person, place, and time.   Skin: Skin is warm and dry.      Labs:        Invalid input(s): [id number]      Recent Labs      01/14/17   0230  01/15/17   0300 01/16/17 0220   SODIUM  136  136  137   POTASSIUM  3.9  3.9  4.3   CHLORIDE  101  105  103   CO2  26  26  25   BUN  17  18  21   CREATININE  1.45*  1.20  1.18   MAGNESIUM  2.1  1.8  1.8   PHOSPHORUS  3.7  3.7   --    CALCIUM  8.7  8.8  9.1     Recent Labs      01/14/17   0230  01/15/17   0300  01/16/17   0220   ALTSGPT  38   --   54*   ASTSGOT  47*   --   55*   ALKPHOSPHAT  59   --   77   TBILIRUBIN  0.4   --   0.3   GLUCOSE  239*  111*  105*     Recent Labs      01/14/17 0230 01/16/17 0220   RBC  4.42*  4.33*   HEMOGLOBIN  11.7*  11.4*   HEMATOCRIT  34.9*  34.9*   PLATELETCT  268  257     Recent Labs      01/14/17 0230 01/16/17 0220   WBC  8.3  5.7   NEUTSPOLYS  65.60  55.00   LYMPHOCYTES  21.50*  30.00   MONOCYTES  9.20  8.90   EOSINOPHILS  2.20  4.00   BASOPHILS  0.50  0.90   ASTSGOT  47*  55*   ALTSGPT  38  54*   ALKPHOSPHAT  59  77   TBILIRUBIN  0.4  0.3           Hemodynamics:  No data recorded.     No Data Recorded         Respiratory:                Fluids:  No intake or output data in the 24 hours ending 01/16/17 1100     GI/Nutrition:  Orders Placed This Encounter   Procedures   • DIET ORDER     Standing Status: Standing      Number of Occurrences: 1      Standing Expiration Date:      Order Specific Question:  Diet:     Answer:  Regular [1]     Order Specific Question:  Diet:      Answer:  Low Fiber(GI Soft) [2]     Medical Decision Making, by Problem:  There are no hospital problems to display for this patient.  1.  Acute kidney injury, nonoliguric.                             - Suspect secondary to vancomycin toxicity                          - Glen UA - does NOT suggest extension to ATN - evaluation points to AIN from Vanc.                            - There was a recent history of bilateral hydronephrosis, but that looks to have recovered by recent imaging                             - Last contrast exposure was 12/14/2016.                             - Cr plateaued at 4.6 and now continues to trending down                          - No immediate need for dialysis              2.  History of acute liver failure in the past.                             - He thinks it was associated with hepatitis C.              3.  Hepatitis C              4.  Biliary leak,                            - Status post biliary stent with perihepatic fluid collection and moderate to large right pleural effusion                          - Status post drains, removed 1/4/17                          - Fungal infection as described above.                          - ID following with ABX adjusted              5.  Significant protein calorie malnourishment                          - Was starting to take PO better (fluids not so much food), but appetite is worse currently.                          - Starting PVN              6.  Anemia                          - No need for CHRISTIE with KALEB                          - Iron stores low but will hold off on IV iron given active infection.                          - Transfuse PRN              7. Renal Osteodystrophy                          - PTH = 17                          - Vit D = 30              8. N/V/Diarrhea                          - C-Diff studies negative                          - Amylase/Lipase wnl                            - Not taking PO well, on parenteral  nutrition              9. Hypomagnesemia                         - Magnesium added to parenteral nutrition per primary care team.  SUGGESTIONS:              --No changes to regimen. Will watch for another 24 hours and if stable will sign off case.              --Monitor BP              --Dose all medications for GFR less than 30.      Core Measures

## 2017-01-16 NOTE — [facility]
Infectious Disease Progress Note    Author: Joy Morillo M.D. Date & Time created: 1/16/2017  2:49 PM    Interval History:  26-year-old male who is status post laparoscopic cholecystectomy with postoperative complications including biliary leak, Candida albicans abscess, and exudative pleural effusion    1/2 AF, WBC 13 c/o RUQ pain, nausea  1/3 AF, WBC 14.5, still with nausea, vomiting and abd pain, renal function worse today  1/4 AF, W BC 11. Still with nausea but no vomiting this am, states he has not eaten in 3 days, Cr slightly improved  1/5 AF, no CBC, drains removed yesterday, still with nausea and poor appetite  1/6 AF, WBC 8.4, still c/o abd pain from drain removal, can't eat, Cr improving  1/7 AF, WBC 9.1, transferred to [facility] overnight, watching football, no changes per pt, Cr continues to improve  1/8 AF, WBC 7.7, was able to eat breakfast without vomiting this am, no appetite at lunch however  1/9 AF, had nausea after dinner last night, sleepy this morning  1/10 AF, WBC 7.9, c/o HA this am, abx switched to unasyn, Cr continues to improve  1/11 AF, still with nausea but no vomiting x 2 days, ate lunch without issues  1/13 AF, WBC 6.7, nausea improving, no vomiting, tolerated dinner last night, abd pain improving  1/14 AF WBC 8.3 still on TPN Drain out Poor oral intake  1/16 AF completed abx  Labs Reviewed, Medications Reviewed, Radiology Reviewed and Wound Reviewed.    Review of Systems:  Review of Systems   Constitutional: Positive for malaise/fatigue. Negative for fever and chills.   Cardiovascular: Negative for chest pain.   Gastrointestinal: Positive for nausea and abdominal pain. Negative for vomiting.   Neurological: Negative for headaches.       Hemodynamics:          Physical Exam:  Physical Exam   Constitutional: He is oriented to person, place, and time. He appears well-developed. No distress.   HENT:   Head: Normocephalic and atraumatic.   Poor dentition   Eyes: EOM are normal. Pupils are  equal, round, and reactive to light.   Neck: Neck supple.   Cardiovascular: Normal rate and regular rhythm.    Pulmonary/Chest: Effort normal and breath sounds normal. No respiratory distress. He has no wheezes. He has no rales.   Abdominal: Soft. He exhibits no distension. There is tenderness.   Drains - removed   Musculoskeletal: He exhibits no edema.   Neurological: He is alert and oriented to person, place, and time.   Skin: No rash noted.   Extensive tattoos       Labs:  Recent Labs      01/14/17 0230  01/16/17   0220   WBC  8.3  5.7   RBC  4.42*  4.33*   HEMOGLOBIN  11.7*  11.4*   HEMATOCRIT  34.9*  34.9*   MCV  79.0*  80.6*   MCH  26.5*  26.3*   RDW  39.8  40.2   PLATELETCT  268  257   MPV  10.2  10.2   NEUTSPOLYS  65.60  55.00   LYMPHOCYTES  21.50*  30.00   MONOCYTES  9.20  8.90   EOSINOPHILS  2.20  4.00   BASOPHILS  0.50  0.90     Recent Labs      01/14/17   0230  01/15/17   0300  01/16/17   0220   SODIUM  136  136  137   POTASSIUM  3.9  3.9  4.3   CHLORIDE  101  105  103   CO2  26  26  25   GLUCOSE  239*  111*  105*   BUN  17  18  21     Recent Labs      01/14/17   0230  01/15/17   0300  01/16/17   0220   ALBUMIN  2.8*  2.8*  2.7*   TBILIRUBIN  0.4   --   0.3   ALKPHOSPHAT  59   --   77   TOTPROTEIN  6.9   --   7.2   ALTSGPT  38   --   54*   ASTSGOT  47*   --   55*   CREATININE  1.45*  1.20  1.18       Wound:      Results     ** No results found for the last 168 hours. **        Peritoneal fluid [place]  Fluids:  Intake/Output     None             Assessment:  •  *Perihepatic fluid collection [K76.89]    •  KALEB (acute kidney injury) (HCC) [N17.9]    •  Leukocytosis [D72.829]    •  Anemia [D64.9]    •  Thrombocytosis (HCC) [D47.3]    •  Elevated alkaline phosphatase level [R74.8]    •  History of hepatitis C [Z86.19]              Plan:  26-year-old male who is status post laparoscopic cholecystectomy with postoperative complications including biliary leak, Candida albicans abscess, and exudative pleural  effusion    Afebrile  Leukocytosis - resolved  HIV neg  Acute renal failure likely due to vancomycin. Cr improved. 1.45  1/2 Repeat CT - minimal right perihepatic fluid  Completed  unasyn and fluc.Stop date 1/15/17.   Appreciate Renal eval  C diff - neg  Advance diet as tolerated  Will sign off-please reconsult if needed  DW IM

## 2017-01-16 NOTE — [facility]
Hospital Medicine Progress Note, Adult, Complex               Author: Keisha Geraldine Date & Time created: 1/16/2017  2:46 PM     Interval History:  CC - infected biloma  Still w/ decreased appetite but ate entire sandwich for dinner last night.    Review of Systems:  Review of Systems   Constitutional: Negative for fever and chills.   HENT: Negative.    Eyes: Negative.    Respiratory: Negative for cough and shortness of breath.    Cardiovascular: Negative for chest pain and palpitations.   Gastrointestinal: Positive for nausea and abdominal pain. Negative for vomiting.   Genitourinary: Negative.    Musculoskeletal: Negative.    Skin: Negative for itching and rash.   Endo/Heme/Allergies: Negative.        Physical Exam:  Physical Exam   Constitutional: He is oriented to person, place, and time. He appears well-developed and well-nourished. No distress.   HENT:   Head: Normocephalic and atraumatic.   Right Ear: External ear normal.   Left Ear: External ear normal.   Nose: Nose normal.   Mouth/Throat: Oropharynx is clear and moist. No oropharyngeal exudate.   Eyes: Conjunctivae and EOM are normal. Right eye exhibits no discharge. Left eye exhibits no discharge. No scleral icterus.   Neck: Normal range of motion. Neck supple. No tracheal deviation present.   Cardiovascular: Normal rate and regular rhythm.    SR   Pulmonary/Chest: Effort normal and breath sounds normal. No stridor. No respiratory distress. He has no wheezes.   Abdominal: Soft. Bowel sounds are normal. He exhibits no distension. There is no tenderness. There is no rebound and no guarding.   Musculoskeletal: He exhibits no edema or tenderness.   Neurological: He is alert and oriented to person, place, and time.   Skin: Skin is warm and dry. He is not diaphoretic.   Vitals reviewed.      Labs:        Invalid input(s): [id number]      Recent Labs      01/14/17   0230  01/15/17   0300  01/16/17   0220   SODIUM  136  136  137   POTASSIUM  3.9  3.9  4.3   CHLORIDE   101  105  103   CO2  26  26  25   BUN  17  18  21   CREATININE  1.45*  1.20  1.18   MAGNESIUM  2.1  1.8  1.8   PHOSPHORUS  3.7  3.7   --    CALCIUM  8.7  8.8  9.1     Recent Labs      01/14/17   0230  01/15/17   0300  01/16/17 0220   ALTSGPT  38   --   54*   ASTSGOT  47*   --   55*   ALKPHOSPHAT  59   --   77   TBILIRUBIN  0.4   --   0.3   GLUCOSE  239*  111*  105*     Recent Labs      01/14/17   0230  01/16/17 0220   RBC  4.42*  4.33*   HEMOGLOBIN  11.7*  11.4*   HEMATOCRIT  34.9*  34.9*   PLATELETCT  268  257     Recent Labs      01/14/17 0230 01/16/17 0220   WBC  8.3  5.7   NEUTSPOLYS  65.60  55.00   LYMPHOCYTES  21.50*  30.00   MONOCYTES  9.20  8.90   EOSINOPHILS  2.20  4.00   BASOPHILS  0.50  0.90   ASTSGOT  47*  55*   ALTSGPT  38  54*   ALKPHOSPHAT  59  77   TBILIRUBIN  0.4  0.3             Medical Decision Making, by Problem:  S/P RECENT CHOLECYSTECTOMY  BILE LEAK S/P BILIARY STENT  YEAST INFECTED BILOMA S/P IR DRAINAGE - completed Fluconazole/Unasyn 1/15/17 per ID, observe off abx  S/P RIGHT THORACENTESIS - F/U CXR shows recurrence of pleural effusion, so started on Lasix w/ good diuresis, will also decrease TPN rate to minimize volume  S/P BILAT HYDRONEPHROSIS  HTN - BP controlled w/ Hydralazine  ASYMPTOMATIC BRADYCARDIA - no more bradycardia on Hydralazine  KALEB - 2/2 Vanco, resolving, Nephro following  ANEMIA - follow H/H, Fe/Vit C  TRANSAMINITIS - likely 2/2 TPN, follow LFT's  HCV  CONSTIPATION - add Miralax to Docusate and Senna  SPCM - PO and TPN for nutritional support  DEBILITY - PT/OT    Reviewed w/ pt, Guards, Pharmacy, Dr. Hurtado        Medications reviewed and Labs reviewed  [place] catheter: No [place]  Central line in place: Need for access    DVT Prophylaxis: Heparin    Ulcer prophylaxis: Yes

## 2017-01-17 LAB
ANION GAP SERPL CALC-SCNC: 10 MMOL/L (ref 0–11.9)
BUN SERPL-MCNC: 22 MG/DL (ref 8–22)
CALCIUM SERPL-MCNC: 9.3 MG/DL (ref 8.4–10.2)
CHLORIDE SERPL-SCNC: 102 MMOL/L (ref 96–112)
CO2 SERPL-SCNC: 23 MMOL/L (ref 20–33)
CREAT SERPL-MCNC: 1.3 MG/DL (ref 0.5–1.4)
GFR SERPL CREATININE-BSD FRML MDRD: >60 ML/MIN/1.73 M 2
GLUCOSE SERPL-MCNC: 113 MG/DL (ref 65–99)
POTASSIUM SERPL-SCNC: 4 MMOL/L (ref 3.6–5.5)
SODIUM SERPL-SCNC: 135 MMOL/L (ref 135–145)

## 2017-01-17 PROCEDURE — 80048 BASIC METABOLIC PNL TOTAL CA: CPT

## 2017-01-17 PROCEDURE — 99232 SBSQ HOSP IP/OBS MODERATE 35: CPT | Performed by: HOSPITALIST

## 2017-01-17 PROCEDURE — 82962 GLUCOSE BLOOD TEST: CPT

## 2017-01-17 ASSESSMENT — ENCOUNTER SYMPTOMS
VOMITING: 0
BACK PAIN: 0
HEADACHES: 0
COUGH: 0
SHORTNESS OF BREATH: 0
FEVER: 0
NAUSEA: 0
ABDOMINAL PAIN: 0

## 2017-01-17 NOTE — PROGRESS NOTES
"[facility] Nephrology Progress Note, Adult, Complex               Author: EMILE Apple-NP  Collaborating Physician: Dr. Scottie Hurtado  Date & Time created: 1/17/2017  11:08 AM     Interval History:  The patient is a very pleasant 26-year-old male who had cholecystitis and underwent a cholecystectomy at the beginning of December.  He initially felt well following this procedure, but then on around 12/12/2016, began having progressive pain in his RUQ, pain was described as deep breathing towards his right shoulder.  He has a history of chronic hepatitis C.  He was found to have a biliary leak and underwent an ERCP with sphincterotomy and biliary stent placement.  He had significant abdominal pain infectious and secondary peritonitis, was subsequently discharged to [facility] for ongoing care.  He was at [facility] for a period of time.  Several days after his transfer to [facility], he was found to have a biloma on CT imaging and underwent a percutaneous drainage by IR.  He then returned to [facility] following this procedure, he continued to be afebrile, was placed on empiric antimicrobial therapy which included Zosyn and vancomycin at that time.  Subsequent drainage cultures demonstrated yeast and was then placed on fluconazole.  Several days after the procedure, the drainage became more serosanguineous.  Followup CT scan demonstrated perihepatic fluid collection, including one in Morison pouch.  Further imaging demonstrated large right pleural effusion as well as new bilateral   hydronephrosis.  He underwent drainage of the perihepatic fluid and right-sided thoracentesis and transferred back to [facility] for ongoing management of these interventional procedures.     The patient tells me he has a remote history of acute renal failure on around the time that he was diagnosed with hepatitis C.  He was told that his kidney numbers \"went up\" the day he was treated as supportively and they improved.   As far he notes they " improved to complete recovery following that incident.     Imaging studies that I could review find that he had a CT scan of the abdomen and pelvis without contrast on 12/13/2016.  CT scan of the abdomen and pelvis with contrast on 12/14/2016.  Additionally, another CT scan of the abdomen and pelvis without contrast on December 22 as well as December 26.   His last contrast exposure was 12/14/2016.  Review his labs finds that his serum creatinine had remained in a relatively normal range under 1.0 during most of this time.  On 12/31/2016, his serum creatinine figueroa to 1.8 and subsequently to 3.69 today.  He is documented to have 1.6 L of urine output today.  He has received no other imaging studies that I could find.  His blood pressure has been relatively stable in the 120-140 range systolic.  There are no urine studies to review.  He has, however, been receiving vancomycin for a prolonged period of time.  His previous levels have remained in the appropriate therapeutic range 13-16, however, most recently it is shot up to 60.3.     DAILY NEPHROLOGY SUMMARY:              1/01/17 - Consult done              1/02/17 - No overnight events.  Off vanc.  On rocephin.                1/03/17 - No events, pt complaining n/v and diarrhea since last night, rate of rise of Cr slowing down and may be plateauing, pt reports good UOP, BP stable, no SOB or LE edema              1/04/17 - No events, Cr improved today, good UOP, BP stable, still with n/v off/on overnight and pt still reports diarrhea yest and this am 1              1/05/17 - No events, Cr trending down, good UOP, per-hepatic drain removed yest, BP stable, good UOP, loose BM overnight but none so far this am, still reports abd tenderness                1/06/17 - No events, still with nausea and episode of emesis last night, still with diarrhea and C-Diff negative, abd pain unchanged, leukocytosis improved and afebrile, Cr trending down, good UOP  "   ----------------------------------------------------------------------------------------------------------------------------              1/06/17 - Transferred to [facility]              1/07/17 - No new events. Still with N/V/D. Not on IVFs. UOP good.                 1/08/17 - Scr continues to improve. Back on IVF until taking PO well. Starting PVN. Nausea better. UOP good. Scr improving.              1/09/17 - Scr continues to improve. Still some nausea, no vomiting. Eating some but didn't eat breakfast, drinking fine. UOP good.                1/10/17 - Scr continues to improve. Started PVN. Still with nausea, not eating much. BP has been higher, started on hydralazine. HR 40's.              1/11/17 - Scr continues to improve. Still w/ nausea but no vomiting. \"Food on TV is actually starting to look good\". Now w/PICC.onPVN.                1/12/17 - No new labs. No new events. Still with nausea, no vomiting. Refused his anti-nausea this am.              1/13/17 - Scr continues to improve.  Patient denies having N/V/D today.                1/14/17 - Scr continues to improve. Magnesium improved. Still has poor appetite.               1/15/17 - Scr continues to improve.  Magnesium stable.  Still has poor appetite.  Pt still on Lasix per primary team for recurrent Rt. Pleural effusion   1/7/17- Feeling okay, some mild nausea.  Eating small amounts but with poor appetite.  Scr stable       Review of Systems:  ROS Constitutional: Negative.  Negative for fever and chills.    HENT: Negative.     Eyes: Negative.     Respiratory: Negative.     Cardiovascular: Negative.     Gastrointestinal: Negative for N/V/D.    Genitourinary: Negative.     Musculoskeletal: Positive for myalgias. Negative for back pain and joint pain.    Skin: Negative.  Negative for itching and rash.   Neurological: Negative    Physical Exam:  Physical Exam Physical Exam Physical Exam Constitutional: He is oriented to person, place, and time. He " appears well-developed and well-nourished.    HENT:    Head: Normocephalic and atraumatic.    Eyes: Conjunctivae and EOM are normal. Pupils are equal, round, and reactive to light.    Neck: Normal range of motion.    Cardiovascular: Normal rate, regular rhythm and normal heart sounds.     Pulmonary/Chest: Effort normal and breath sounds normal. No respiratory distress. He has no wheezes.    Abdominal: Bowel sounds are normal. There is tenderness.   Musculoskeletal: Normal range of motion. He exhibits no edema.   Neurological: He is alert and oriented to person, place, and time.   Skin: Skin is warm and dry.      Labs:        Invalid input(s): [id number]      Recent Labs      01/15/17   0300  01/16/17   0220  01/17/17   0340   SODIUM  136  137  135   POTASSIUM  3.9  4.3  4.0   CHLORIDE  105  103  102   CO2  26  25  23   BUN  18  21  22   CREATININE  1.20  1.18  1.30   MAGNESIUM  1.8  1.8   --    PHOSPHORUS  3.7   --    --    CALCIUM  8.8  9.1  9.3     Recent Labs      01/15/17   0300  01/16/17   0220  01/17/17   0340   ALTSGPT   --   54*   --    ASTSGOT   --   55*   --    ALKPHOSPHAT   --   77   --    TBILIRUBIN   --   0.3   --    GLUCOSE  111*  105*  113*     Recent Labs      01/16/17 0220   RBC  4.33*   HEMOGLOBIN  11.4*   HEMATOCRIT  34.9*   PLATELETCT  257     Recent Labs      01/16/17 0220   WBC  5.7   NEUTSPOLYS  55.00   LYMPHOCYTES  30.00   MONOCYTES  8.90   EOSINOPHILS  4.00   BASOPHILS  0.90   ASTSGOT  55*   ALTSGPT  54*   ALKPHOSPHAT  77   TBILIRUBIN  0.3           Hemodynamics:  No data recorded.     No Data Recorded         Respiratory:                Fluids:  No intake or output data in the 24 hours ending 01/17/17 1108     GI/Nutrition:  Orders Placed This Encounter   Procedures   • DIET ORDER     Standing Status: Standing      Number of Occurrences: 1      Standing Expiration Date:      Order Specific Question:  Diet:     Answer:  Regular [1]     Order Specific Question:  Diet:     Answer:  Low  Fiber(GI Soft) [2]     Medical Decision Making, by Problem:  There are no hospital problems to display for this patient.  1.  Acute kidney injury, nonoliguric.                             - Suspect secondary to vancomycin toxicity                          - [place] UA - does NOT suggest extension to ATN - evaluation points to AIN from Vanc.                            - There was a recent history of bilateral hydronephrosis, but that looks to have recovered by recent imaging                             - Last contrast exposure was 12/14/2016.                             - Cr plateaued at 4.6 and now continues to trending down                          - No immediate need for dialysis              2.  History of acute liver failure in the past.                             - He thinks it was associated with hepatitis C.              3.  Hepatitis C              4.  Biliary leak,                            - Status post biliary stent with perihepatic fluid collection and moderate to large right pleural effusion                          - Status post drains, removed 1/4/17                          - Fungal infection as described above.                          - ID following with ABX adjusted              5.  Significant protein calorie malnourishment                          - Was starting to take PO better (fluids not so much food), but appetite is worse currently.                          - Starting PVN              6.  Anemia                          - No need for CHRISTIE with KALEB                          - Iron stores low but will hold off on IV iron given active infection.                          - Transfuse PRN              7. Renal Osteodystrophy                          - PTH = 17                          - Vit D = 30              8. N/V/Diarrhea                          - C-Diff studies negative                          - Amylase/Lipase wnl                            - Not taking PO well, on parenteral nutrition -  improved               9. Hypomagnesemia                         - Magnesium added to parenteral nutrition per primary care team.  SUGGESTIONS:              --No changes to regimen.               --Monitor BP              --Dose all medications for GFR less than 30.   -- Will sign off case, call with any questions or for Re-consult      Core Measures

## 2017-01-17 NOTE — [facility]
Hospital Medicine Progress Note, Adult, Complex               Author: Paige MADELINE Farrell Date & Time created: 1/17/2017  10:12 AM     CC: bile leak    Interval History:  No events  Not nauseated or having pain-just not hungry    Review of Systems:  Review of Systems   Constitutional: Positive for malaise/fatigue. Negative for fever.   Respiratory: Negative for cough and shortness of breath.    Cardiovascular: Negative for chest pain.   Gastrointestinal: Negative for nausea, vomiting and abdominal pain.   Genitourinary: Negative for dysuria.   Musculoskeletal: Negative for back pain.   Neurological: Negative for headaches.       T 97.1P65 /91RR 16 Sa02 96% ra I/O2.2/3.3   Physical Exam   Constitutional: He is oriented to person, place, and time. He appears well-developed. No distress.   HENT:   Head: Normocephalic and atraumatic.   Eyes: Conjunctivae are normal. Right eye exhibits no discharge. Left eye exhibits no discharge.   Neck: No tracheal deviation present.   Cardiovascular: Regular rhythm and intact distal pulses.    No murmur heard.  Julio César 40s   Pulmonary/Chest: Effort normal. No respiratory distress. He exhibits no tenderness.   Abdominal: Soft. Bowel sounds are normal. He exhibits no distension. There is tenderness (mild RUQ).   Musculoskeletal: He exhibits no edema.   Neurological: He is alert and oriented to person, place, and time.   Skin: Skin is warm.   Vitals reviewed.      Labs:        Invalid input(s): [id number]      Recent Labs      01/15/17   0300  01/16/17 0220  01/17/17   0340   SODIUM  136  137  135   POTASSIUM  3.9  4.3  4.0   CHLORIDE  105  103  102   CO2  26  25  23   BUN  18  21  22   CREATININE  1.20  1.18  1.30   MAGNESIUM  1.8  1.8   --    PHOSPHORUS  3.7   --    --    CALCIUM  8.8  9.1  9.3     Recent Labs      01/15/17   0300  01/16/17   0220  01/17/17   0340   ALTSGPT   --   54*   --    ASTSGOT   --   55*   --    ALKPHOSPHAT   --   77   --    TBILIRUBIN   --   0.3   --     GLUCOSE  111*  105*  113*     Recent Labs      01/16/17 0220   RBC  4.33*   HEMOGLOBIN  11.4*   HEMATOCRIT  34.9*   PLATELETCT  257     Recent Labs      01/16/17 0220   WBC  5.7   NEUTSPOLYS  55.00   LYMPHOCYTES  30.00   MONOCYTES  8.90   EOSINOPHILS  4.00   BASOPHILS  0.90   ASTSGOT  55*   ALTSGPT  54*   ALKPHOSPHAT  77   TBILIRUBIN  0.3     Medical Decision Making, by Problem:  S/p cholecystectomy PTA  Bile leak treated with biliary stent  Biloma s/p IR drainage infected with candida-abx completed  Vanco induced ARF-resolved  R thoracentesis hx   -lasix for effusion  Anemia  HTN   -hydralazine  Anorexia   -add megace  Bradycardia   -resolved   -dc monitor  HCV  Nutrition   -TPN   -megace for po      Medications reviewed and Labs reviewed  [place] catheter: No [place]  Central line in place: TPN    DVT Prophylaxis: Heparin

## 2017-01-18 LAB
ALBUMIN SERPL BCP-MCNC: 3.1 G/DL (ref 3.2–4.9)
ALBUMIN/GLOB SERPL: 0.8 G/DL
ALP SERPL-CCNC: 87 U/L (ref 30–99)
ALT SERPL-CCNC: 57 U/L (ref 2–50)
ANION GAP SERPL CALC-SCNC: 11 MMOL/L (ref 0–11.9)
AST SERPL-CCNC: 39 U/L (ref 12–45)
BILIRUB SERPL-MCNC: 0.7 MG/DL (ref 0.1–1.5)
BUN SERPL-MCNC: 20 MG/DL (ref 8–22)
CALCIUM SERPL-MCNC: 9.5 MG/DL (ref 8.4–10.2)
CHLORIDE SERPL-SCNC: 100 MMOL/L (ref 96–112)
CO2 SERPL-SCNC: 23 MMOL/L (ref 20–33)
CREAT SERPL-MCNC: 1.11 MG/DL (ref 0.5–1.4)
GFR SERPL CREATININE-BSD FRML MDRD: >60 ML/MIN/1.73 M 2
GLOBULIN SER CALC-MCNC: 4.1 G/DL (ref 1.9–3.5)
GLUCOSE BLD-MCNC: 93 MG/DL (ref 65–99)
GLUCOSE SERPL-MCNC: 115 MG/DL (ref 65–99)
MAGNESIUM SERPL-MCNC: 1.8 MG/DL (ref 1.5–2.5)
PHOSPHATE SERPL-MCNC: 4.9 MG/DL (ref 2.5–4.5)
POTASSIUM SERPL-SCNC: 4.1 MMOL/L (ref 3.6–5.5)
PROT SERPL-MCNC: 7.2 G/DL (ref 6–8.2)
SODIUM SERPL-SCNC: 134 MMOL/L (ref 135–145)

## 2017-01-18 PROCEDURE — 83735 ASSAY OF MAGNESIUM: CPT

## 2017-01-18 PROCEDURE — 99232 SBSQ HOSP IP/OBS MODERATE 35: CPT | Performed by: HOSPITALIST

## 2017-01-18 PROCEDURE — 84100 ASSAY OF PHOSPHORUS: CPT

## 2017-01-18 PROCEDURE — 80053 COMPREHEN METABOLIC PANEL: CPT

## 2017-01-18 PROCEDURE — 82962 GLUCOSE BLOOD TEST: CPT

## 2017-01-18 ASSESSMENT — ENCOUNTER SYMPTOMS
FEVER: 0
HEADACHES: 0
BACK PAIN: 0
ABDOMINAL PAIN: 1
NAUSEA: 0
COUGH: 0
DIZZINESS: 0
PALPITATIONS: 0
SHORTNESS OF BREATH: 0
VOMITING: 0

## 2017-01-18 NOTE — [facility]
Hospital Medicine Progress Note, Adult, Complex               Author: Paige MADELINE Farrell Date & Time created: 1/18/2017  8:59 AM     CC: bile leak    Interval History:  Still not eating because not hungry  Megace started    Review of Systems:  Review of Systems   Constitutional: Negative for fever.   Respiratory: Negative for cough and shortness of breath.    Cardiovascular: Negative for chest pain and palpitations.   Gastrointestinal: Positive for abdominal pain (mild). Negative for nausea and vomiting.   Musculoskeletal: Negative for back pain.   Neurological: Negative for dizziness and headaches.       T 97.8P81 /81RR 19 Sa02 92% ra I/O2.9/1.4+   Physical Exam   Constitutional: He is oriented to person, place, and time. He appears well-developed.   HENT:   Head: Normocephalic and atraumatic.   Eyes: Conjunctivae are normal. Right eye exhibits no discharge. Left eye exhibits no discharge. No scleral icterus.   Neck: No tracheal deviation present.   Cardiovascular: Regular rhythm and intact distal pulses.    No murmur heard.  Pulmonary/Chest: Effort normal. No respiratory distress. He exhibits no tenderness.   Abdominal: Soft. Bowel sounds are normal. He exhibits no distension. There is tenderness (mild RUQ).   Musculoskeletal: He exhibits no edema.   Neurological: He is alert and oriented to person, place, and time.   Skin: Skin is warm.   Vitals reviewed.      Labs:        Invalid input(s): [id number]      Recent Labs      01/16/17 0220 01/17/17 0340 01/18/17   0515   SODIUM  137  135  134*   POTASSIUM  4.3  4.0  4.1   CHLORIDE  103  102  100   CO2  25  23  23   BUN  21  22  20   CREATININE  1.18  1.30  1.11   MAGNESIUM  1.8   --   1.8   PHOSPHORUS   --    --   4.9*   CALCIUM  9.1  9.3  9.5     Recent Labs      01/16/17 0220 01/17/17 0340  01/18/17   0515   ALTSGPT  54*   --   57*   ASTSGOT  55*   --   39   ALKPHOSPHAT  77   --   87   TBILIRUBIN  0.3   --   0.7   GLUCOSE  105*  113*  115*      Recent Labs      01/16/17 0220   RBC  4.33*   HEMOGLOBIN  11.4*   HEMATOCRIT  34.9*   PLATELETCT  257     Recent Labs      01/16/17 0220  01/18/17   0515   WBC  5.7   --    NEUTSPOLYS  55.00   --    LYMPHOCYTES  30.00   --    MONOCYTES  8.90   --    EOSINOPHILS  4.00   --    BASOPHILS  0.90   --    ASTSGOT  55*  39   ALTSGPT  54*  57*   ALKPHOSPHAT  77  87   TBILIRUBIN  0.3  0.7     Medical Decision Making, by Problem:  S/p cholecystectomy PTA  Bile leak treated with biliary stent  Biloma s/p IR drainage infected with candida-abx completed  Vanco induced ARF-resolved  R thoracentesis hx   -lasix for effusion, need strict I and O  Anemia  HTN   -better with increase hydralazine  Mild hyponatremia  Anorexia   -megace  Bradycardia   -resolved  HCV  Nutrition   -TPN   -megace       Medications reviewed and Labs reviewed  [place] catheter: No [place]  Central line in place: TPN    DVT Prophylaxis: Heparin

## 2017-01-19 ENCOUNTER — APPOINTMENT (OUTPATIENT)
Dept: RADIOLOGY | Facility: MEDICAL CENTER | Age: 27
End: 2017-01-19
Attending: HOSPITALIST
Payer: COMMERCIAL

## 2017-01-19 LAB
ALBUMIN SERPL BCP-MCNC: 3.7 G/DL (ref 3.2–4.9)
ALP SERPL-CCNC: 110 U/L (ref 30–99)
ALT SERPL-CCNC: 49 U/L (ref 2–50)
ANION GAP SERPL CALC-SCNC: 9 MMOL/L (ref 0–11.9)
AST SERPL-CCNC: 34 U/L (ref 12–45)
BILIRUB CONJ SERPL-MCNC: 0.1 MG/DL (ref 0.1–0.5)
BILIRUB INDIRECT SERPL-MCNC: 0.5 MG/DL (ref 0–1)
BILIRUB SERPL-MCNC: 0.6 MG/DL (ref 0.1–1.5)
BUN SERPL-MCNC: 21 MG/DL (ref 8–22)
CALCIUM SERPL-MCNC: 9.8 MG/DL (ref 8.4–10.2)
CHLORIDE SERPL-SCNC: 103 MMOL/L (ref 96–112)
CO2 SERPL-SCNC: 22 MMOL/L (ref 20–33)
CREAT SERPL-MCNC: 1.14 MG/DL (ref 0.5–1.4)
GFR SERPL CREATININE-BSD FRML MDRD: >60 ML/MIN/1.73 M 2
GLUCOSE BLD-MCNC: 94 MG/DL (ref 65–99)
GLUCOSE SERPL-MCNC: 102 MG/DL (ref 65–99)
LIPASE SERPL-CCNC: 76 U/L (ref 7–58)
POTASSIUM SERPL-SCNC: 4.4 MMOL/L (ref 3.6–5.5)
PROT SERPL-MCNC: 8.4 G/DL (ref 6–8.2)
SODIUM SERPL-SCNC: 134 MMOL/L (ref 135–145)

## 2017-01-19 PROCEDURE — 80048 BASIC METABOLIC PNL TOTAL CA: CPT

## 2017-01-19 PROCEDURE — 74176 CT ABD & PELVIS W/O CONTRAST: CPT

## 2017-01-19 PROCEDURE — 82962 GLUCOSE BLOOD TEST: CPT

## 2017-01-19 PROCEDURE — 99232 SBSQ HOSP IP/OBS MODERATE 35: CPT | Performed by: HOSPITALIST

## 2017-01-19 PROCEDURE — 83690 ASSAY OF LIPASE: CPT

## 2017-01-19 PROCEDURE — 80076 HEPATIC FUNCTION PANEL: CPT

## 2017-01-19 ASSESSMENT — ENCOUNTER SYMPTOMS
DIZZINESS: 1
HEADACHES: 0
COUGH: 0
VOMITING: 0
PALPITATIONS: 0
DIARRHEA: 0
NAUSEA: 0
SHORTNESS OF BREATH: 0
BACK PAIN: 0
ABDOMINAL PAIN: 1
FEVER: 0
CONSTIPATION: 0

## 2017-01-19 NOTE — [facility]
Hospital Medicine Progress Note, Adult, Complex               Author: Paige MADELINE Farrell Date & Time created: 1/19/2017  2:00 PM     CC: bile leak    Interval History:  Worsening abd pain today  No radiation, 8/10 squeezing, lipase mildly elevated    Review of Systems:  Review of Systems   Constitutional: Negative for fever.   Respiratory: Negative for cough and shortness of breath.    Cardiovascular: Negative for chest pain and palpitations.   Gastrointestinal: Positive for abdominal pain (epigastric). Negative for nausea, vomiting, diarrhea and constipation.   Musculoskeletal: Negative for back pain.   Neurological: Positive for dizziness (when out of bed). Negative for headaches.       T 97.6P82 /75RR 18 Sa02 96% ra I/O2.9/1.8+   Physical Exam   Constitutional: He is oriented to person, place, and time. He appears well-developed. No distress.   HENT:   Head: Normocephalic and atraumatic.   Eyes: Conjunctivae are normal. Right eye exhibits no discharge. Left eye exhibits no discharge. No scleral icterus.   Neck: No tracheal deviation present.   Cardiovascular: Regular rhythm and intact distal pulses.    No murmur heard.  Pulmonary/Chest: Effort normal. No respiratory distress. He exhibits no tenderness.   Abdominal: Soft. Bowel sounds are normal. He exhibits no distension. There is tenderness (epigastric/RUQ).   Musculoskeletal: He exhibits no edema.   Neurological: He is alert and oriented to person, place, and time.   Skin: Skin is warm.   Vitals reviewed.      Labs:        Invalid input(s): [id number]      Recent Labs      01/17/17 0340 01/18/17   0515  01/19/17   0430   SODIUM  135  134*  134*   POTASSIUM  4.0  4.1  4.4   CHLORIDE  102  100  103   CO2  23  23  22   BUN  22  20  21   CREATININE  1.30  1.11  1.14   MAGNESIUM   --   1.8   --    PHOSPHORUS   --   4.9*   --    CALCIUM  9.3  9.5  9.8     Recent Labs      01/17/17 0340 01/18/17   0515  01/19/17   0430   ALTSGPT   --   57*  49   ASTSGOT    --   39  34   ALKPHOSPHAT   --   87  110*   TBILIRUBIN   --   0.7  0.6   DBILIRUBIN   --    --   0.1   LIPASE   --    --   76*   GLUCOSE  113*  115*  102*     No results for input(s): RBC, HEMOGLOBIN, HEMATOCRIT, PLATELETCT, PROTHROMBTM, APTT, INR, IRON, FERRITIN, TOTIRONBC in the last 72 hours.  Recent Labs      01/18/17   0515  01/19/17   0430   ASTSGOT  39  34   ALTSGPT  57*  49   ALKPHOSPHAT  87  110*   TBILIRUBIN  0.7  0.6     Medical Decision Making, by Problem:  New AP   -concern for pancreas or other process with history   -check CT scan, no IV contrast due to recent renal failure  S/p cholecystectomy PTA  Bile leak treated with biliary stent  Biloma s/p IR drainage infected with candida-abx completed  Vanco induced ARF-resolved  R thoracentesis hx   -lasix for effusion, need strict I and O  Anemia  HTN   -DC hydralazine for orthostatic dizziness, and check orthostatics  Mild hyponatremia  Anorexia   -megace on hold, NPO for above  HCV  Nutrition   -TPN    Medications reviewed and Labs reviewed  [place] catheter: No [place]  Central line in place: TPN    DVT Prophylaxis: Heparin

## 2017-01-20 LAB
ALBUMIN SERPL BCP-MCNC: 3.5 G/DL (ref 3.2–4.9)
ALBUMIN/GLOB SERPL: 0.8 G/DL
ALP SERPL-CCNC: 296 U/L (ref 30–99)
ALT SERPL-CCNC: 134 U/L (ref 2–50)
ANION GAP SERPL CALC-SCNC: 11 MMOL/L (ref 0–11.9)
AST SERPL-CCNC: 100 U/L (ref 12–45)
BASOPHILS # BLD AUTO: 0.6 % (ref 0–1.8)
BASOPHILS # BLD: 0.04 K/UL (ref 0–0.12)
BILIRUB SERPL-MCNC: 0.7 MG/DL (ref 0.1–1.5)
BUN SERPL-MCNC: 21 MG/DL (ref 8–22)
CALCIUM SERPL-MCNC: 9.6 MG/DL (ref 8.4–10.2)
CHLORIDE SERPL-SCNC: 101 MMOL/L (ref 96–112)
CO2 SERPL-SCNC: 21 MMOL/L (ref 20–33)
CREAT SERPL-MCNC: 1.04 MG/DL (ref 0.5–1.4)
EOSINOPHIL # BLD AUTO: 0.13 K/UL (ref 0–0.51)
EOSINOPHIL NFR BLD: 1.8 % (ref 0–6.9)
ERYTHROCYTE [DISTWIDTH] IN BLOOD BY AUTOMATED COUNT: 40.2 FL (ref 35.9–50)
GFR SERPL CREATININE-BSD FRML MDRD: >60 ML/MIN/1.73 M 2
GLOBULIN SER CALC-MCNC: 4.3 G/DL (ref 1.9–3.5)
GLUCOSE BLD-MCNC: 102 MG/DL (ref 65–99)
GLUCOSE BLD-MCNC: 106 MG/DL (ref 65–99)
GLUCOSE SERPL-MCNC: 93 MG/DL (ref 65–99)
HCT VFR BLD AUTO: 38.5 % (ref 42–52)
HGB BLD-MCNC: 13 G/DL (ref 14–18)
IMM GRANULOCYTES # BLD AUTO: 0.06 K/UL (ref 0–0.11)
IMM GRANULOCYTES NFR BLD AUTO: 0.8 % (ref 0–0.9)
LIPASE SERPL-CCNC: 80 U/L (ref 7–58)
LYMPHOCYTES # BLD AUTO: 2.15 K/UL (ref 1–4.8)
LYMPHOCYTES NFR BLD: 29.9 % (ref 22–41)
MAGNESIUM SERPL-MCNC: 1.8 MG/DL (ref 1.5–2.5)
MCH RBC QN AUTO: 26.4 PG (ref 27–33)
MCHC RBC AUTO-ENTMCNC: 33.8 G/DL (ref 33.7–35.3)
MCV RBC AUTO: 78.3 FL (ref 81.4–97.8)
MONOCYTES # BLD AUTO: 0.72 K/UL (ref 0–0.85)
MONOCYTES NFR BLD AUTO: 10 % (ref 0–13.4)
NEUTROPHILS # BLD AUTO: 4.09 K/UL (ref 1.82–7.42)
NEUTROPHILS NFR BLD: 56.9 % (ref 44–72)
NRBC # BLD AUTO: 0 K/UL
NRBC BLD AUTO-RTO: 0 /100 WBC
PHOSPHATE SERPL-MCNC: 5.1 MG/DL (ref 2.5–4.5)
PLATELET # BLD AUTO: 245 K/UL (ref 164–446)
PMV BLD AUTO: 9.9 FL (ref 9–12.9)
POTASSIUM SERPL-SCNC: 4.5 MMOL/L (ref 3.6–5.5)
PROT SERPL-MCNC: 7.8 G/DL (ref 6–8.2)
RBC # BLD AUTO: 4.92 M/UL (ref 4.7–6.1)
SODIUM SERPL-SCNC: 133 MMOL/L (ref 135–145)
TRIGL SERPL-MCNC: 80 MG/DL (ref 0–149)
WBC # BLD AUTO: 7.2 K/UL (ref 4.8–10.8)

## 2017-01-20 PROCEDURE — 83735 ASSAY OF MAGNESIUM: CPT

## 2017-01-20 PROCEDURE — 80053 COMPREHEN METABOLIC PANEL: CPT

## 2017-01-20 PROCEDURE — 84100 ASSAY OF PHOSPHORUS: CPT

## 2017-01-20 PROCEDURE — 83690 ASSAY OF LIPASE: CPT

## 2017-01-20 PROCEDURE — 84478 ASSAY OF TRIGLYCERIDES: CPT

## 2017-01-20 PROCEDURE — 99232 SBSQ HOSP IP/OBS MODERATE 35: CPT | Performed by: HOSPITALIST

## 2017-01-20 PROCEDURE — 82962 GLUCOSE BLOOD TEST: CPT

## 2017-01-20 PROCEDURE — 85025 COMPLETE CBC W/AUTO DIFF WBC: CPT

## 2017-01-20 ASSESSMENT — ENCOUNTER SYMPTOMS
VOMITING: 0
HEADACHES: 0
FEVER: 0
NAUSEA: 0
SHORTNESS OF BREATH: 0
DIARRHEA: 0
CONSTIPATION: 0
COUGH: 0
BACK PAIN: 0
PALPITATIONS: 0

## 2017-01-20 NOTE — [facility]
Hospital Medicine Progress Note, Adult, Complex               Author: Paige CORREA Cadogan Date & Time created: 1/20/2017  11:15 AM     CC: bile leak    Interval History:  Epigastric pain resolved today, now significant RUQ pain  No N/V  CT with pneumobilia (new from 1/2 CT)  Mild inflammation to represent pancreatitis    Review of Systems:  Review of Systems   Constitutional: Negative for fever.   Respiratory: Negative for cough and shortness of breath.    Cardiovascular: Negative for chest pain and palpitations.   Gastrointestinal: Negative for nausea, vomiting, diarrhea and constipation. Abdominal pain: RUQ, severe this am.   Musculoskeletal: Negative for back pain.   Neurological: Negative for headaches.       T 98.8P79 /79RR 20 Sa02 95% ra I/O2.5/brp   Physical Exam   Constitutional: He is oriented to person, place, and time. He appears well-developed.   HENT:   Head: Normocephalic and atraumatic.   Eyes: Conjunctivae are normal. Right eye exhibits no discharge. Left eye exhibits no discharge. No scleral icterus.   Neck: No tracheal deviation present.   Cardiovascular: Regular rhythm and intact distal pulses.    No murmur heard.  Pulmonary/Chest: Effort normal. No respiratory distress. He exhibits no tenderness.   Abdominal: Soft. Bowel sounds are normal. He exhibits no distension. There is tenderness (RUQ, no epigastric pain).   Musculoskeletal: He exhibits no edema.   Neurological: He is alert and oriented to person, place, and time.   Skin: Skin is warm.   Vitals reviewed.      Labs:        Invalid input(s): [id number]      Recent Labs      01/18/17 0515 01/19/17   0430  01/20/17   0300   SODIUM  134*  134*  133*   POTASSIUM  4.1  4.4  4.5   CHLORIDE  100  103  101   CO2  23  22  21   BUN  20  21  21   CREATININE  1.11  1.14  1.04   MAGNESIUM  1.8   --   1.8   PHOSPHORUS  4.9*   --   5.1*   CALCIUM  9.5  9.8  9.6     Recent Labs      01/18/17 0515 01/19/17   0430  01/20/17   0300   ALTSGPT  57*   49  134*   ASTSGOT  39  34  100*   ALKPHOSPHAT  87  110*  296*   TBILIRUBIN  0.7  0.6  0.7   DBILIRUBIN   --   0.1   --    LIPASE   --   76*  80*   GLUCOSE  115*  102*  93     Recent Labs      01/20/17   0300   RBC  4.92   HEMOGLOBIN  13.0*   HEMATOCRIT  38.5*   PLATELETCT  245     Recent Labs      01/18/17   0515  01/19/17   0430  01/20/17   0300   WBC   --    --   7.2   NEUTSPOLYS   --    --   56.90   LYMPHOCYTES   --    --   29.90   MONOCYTES   --    --   10.00   EOSINOPHILS   --    --   1.80   BASOPHILS   --    --   0.60   ASTSGOT  39  34  100*   ALTSGPT  57*  49  134*   ALKPHOSPHAT  87  110*  296*   TBILIRUBIN  0.7  0.6  0.7   CT A/P 1/19    Interval removal of the previously noted pigtail catheters.    Small residual perihepatic collection.    Interval decrease in size of the fluid collection anterior to the left lobe of the liver measuring 2.1 x 1 cm.    Collection in the gallbladder fossa measures 2 x 3.9 cm.    Interval decrease in size of the fluid collection in the anterior right pararenal space.    Mild stranding about the second portion of the duodenum may be related to pancreatitis or can be seen in duodenitis/peptic ulcer disease.    Pneumobilia with biliary stent noted. Mild biliary ductal dilatation.    Minimal urothelial thickening may be infectious or inflammatory. No hydronephrosis is seen.    Moderate amount of colonic stool.    Small right pleural effusion with overlying atelectasis/consolidation is decreased in size compared to prior.    Medical Decision Making, by Problem:  New AP   -pneumobilia concerning with rising LFts   -have called [facility], waiting for return call   -possible mild pancreatitis   -empiric abx  Increased LFTs   -secondary to above, follow  S/p cholecystectomy PTA  Bile leak treated with biliary stent  Biloma s/p IR drainage infected with candida-abx completed  Vanco induced ARF-resolved  R thoracentesis hx  Anemia   -hemoconcentrated today  HTN  Mild  hyponatremia  HCV  Nutrition   -TPN    Medications reviewed, Labs reviewed and Radiology images reviewed  [place] catheter: No [place]  Central line in place: TPN    DVT Prophylaxis: Heparin

## 2017-01-21 LAB
ALBUMIN SERPL BCP-MCNC: 3.3 G/DL (ref 3.2–4.9)
ALBUMIN/GLOB SERPL: 0.8 G/DL
ALP SERPL-CCNC: 202 U/L (ref 30–99)
ALT SERPL-CCNC: 79 U/L (ref 2–50)
ANION GAP SERPL CALC-SCNC: 9 MMOL/L (ref 0–11.9)
AST SERPL-CCNC: 34 U/L (ref 12–45)
BASOPHILS # BLD AUTO: 1 % (ref 0–1.8)
BASOPHILS # BLD: 0.06 K/UL (ref 0–0.12)
BILIRUB SERPL-MCNC: 0.5 MG/DL (ref 0.1–1.5)
BUN SERPL-MCNC: 19 MG/DL (ref 8–22)
CALCIUM SERPL-MCNC: 9.6 MG/DL (ref 8.4–10.2)
CHLORIDE SERPL-SCNC: 105 MMOL/L (ref 96–112)
CO2 SERPL-SCNC: 21 MMOL/L (ref 20–33)
CREAT SERPL-MCNC: 0.99 MG/DL (ref 0.5–1.4)
EOSINOPHIL # BLD AUTO: 0.14 K/UL (ref 0–0.51)
EOSINOPHIL NFR BLD: 2.2 % (ref 0–6.9)
ERYTHROCYTE [DISTWIDTH] IN BLOOD BY AUTOMATED COUNT: 40.7 FL (ref 35.9–50)
GFR SERPL CREATININE-BSD FRML MDRD: >60 ML/MIN/1.73 M 2
GLOBULIN SER CALC-MCNC: 3.9 G/DL (ref 1.9–3.5)
GLUCOSE BLD-MCNC: 102 MG/DL (ref 65–99)
GLUCOSE SERPL-MCNC: 116 MG/DL (ref 65–99)
HCT VFR BLD AUTO: 36.7 % (ref 42–52)
HGB BLD-MCNC: 12 G/DL (ref 14–18)
IMM GRANULOCYTES # BLD AUTO: 0.07 K/UL (ref 0–0.11)
IMM GRANULOCYTES NFR BLD AUTO: 1.1 % (ref 0–0.9)
LIPASE SERPL-CCNC: 79 U/L (ref 7–58)
LYMPHOCYTES # BLD AUTO: 2.24 K/UL (ref 1–4.8)
LYMPHOCYTES NFR BLD: 35.9 % (ref 22–41)
MAGNESIUM SERPL-MCNC: 1.8 MG/DL (ref 1.5–2.5)
MCH RBC QN AUTO: 26.1 PG (ref 27–33)
MCHC RBC AUTO-ENTMCNC: 32.7 G/DL (ref 33.7–35.3)
MCV RBC AUTO: 80 FL (ref 81.4–97.8)
MONOCYTES # BLD AUTO: 0.6 K/UL (ref 0–0.85)
MONOCYTES NFR BLD AUTO: 9.6 % (ref 0–13.4)
NEUTROPHILS # BLD AUTO: 3.13 K/UL (ref 1.82–7.42)
NEUTROPHILS NFR BLD: 50.2 % (ref 44–72)
NRBC # BLD AUTO: 0 K/UL
NRBC BLD AUTO-RTO: 0 /100 WBC
PHOSPHATE SERPL-MCNC: 3.7 MG/DL (ref 2.5–4.5)
PLATELET # BLD AUTO: 224 K/UL (ref 164–446)
PMV BLD AUTO: 9.6 FL (ref 9–12.9)
POTASSIUM SERPL-SCNC: 4.4 MMOL/L (ref 3.6–5.5)
PROT SERPL-MCNC: 7.2 G/DL (ref 6–8.2)
RBC # BLD AUTO: 4.59 M/UL (ref 4.7–6.1)
SODIUM SERPL-SCNC: 135 MMOL/L (ref 135–145)
WBC # BLD AUTO: 6.2 K/UL (ref 4.8–10.8)

## 2017-01-21 PROCEDURE — 99232 SBSQ HOSP IP/OBS MODERATE 35: CPT | Performed by: HOSPITALIST

## 2017-01-21 PROCEDURE — 82962 GLUCOSE BLOOD TEST: CPT

## 2017-01-21 PROCEDURE — 85025 COMPLETE CBC W/AUTO DIFF WBC: CPT

## 2017-01-21 PROCEDURE — 84100 ASSAY OF PHOSPHORUS: CPT

## 2017-01-21 PROCEDURE — 83690 ASSAY OF LIPASE: CPT

## 2017-01-21 PROCEDURE — 83735 ASSAY OF MAGNESIUM: CPT

## 2017-01-21 PROCEDURE — 80053 COMPREHEN METABOLIC PANEL: CPT

## 2017-01-21 ASSESSMENT — ENCOUNTER SYMPTOMS
ABDOMINAL PAIN: 1
CONSTIPATION: 0
SHORTNESS OF BREATH: 0
FEVER: 0
BACK PAIN: 0
HEADACHES: 0
VOMITING: 0
NAUSEA: 0
DIARRHEA: 0
PALPITATIONS: 0
COUGH: 0
DIZZINESS: 0

## 2017-01-21 NOTE — [facility]
Hospital Medicine Progress Note, Adult, Complex               Author: Paige MADELINE Farrell Date & Time created: 1/21/2017  3:45 PM     CC: bile leak    Interval History:  Discussed with GI, bili normal-observe  Eating 100%, not worsening abd pain  Labs improved overnight    Review of Systems:  Review of Systems   Constitutional: Negative for fever.   Respiratory: Negative for cough and shortness of breath.    Cardiovascular: Negative for chest pain and palpitations.   Gastrointestinal: Positive for abdominal pain (RUQ). Negative for nausea, vomiting, diarrhea and constipation.   Musculoskeletal: Negative for back pain.   Neurological: Negative for dizziness and headaches.       T 97.5P81 /81RR18 Sa02 96% ra I/O4.6/1.7 1BM   Physical Exam   Constitutional: He is oriented to person, place, and time. He appears well-developed. No distress.   HENT:   Head: Normocephalic and atraumatic.   Eyes: Conjunctivae are normal. Right eye exhibits no discharge. Left eye exhibits no discharge.   Neck: No tracheal deviation present.   Cardiovascular: Regular rhythm and intact distal pulses.    No murmur heard.  Pulmonary/Chest: Effort normal. No respiratory distress. He exhibits no tenderness.   Abdominal: Soft. Bowel sounds are normal. He exhibits no distension. There is tenderness (RUQ).   Musculoskeletal: He exhibits no edema.   Neurological: He is alert and oriented to person, place, and time.   Skin: Skin is warm.   Vitals reviewed.      Labs:        Invalid input(s): [id number]      Recent Labs      01/19/17   0430  01/20/17   0300  01/21/17   0355   SODIUM  134*  133*  135   POTASSIUM  4.4  4.5  4.4   CHLORIDE  103  101  105   CO2  22  21  21   BUN  21  21  19   CREATININE  1.14  1.04  0.99   MAGNESIUM   --   1.8  1.8   PHOSPHORUS   --   5.1*  3.7   CALCIUM  9.8  9.6  9.6     Recent Labs      01/19/17   0430  01/20/17   0300  01/21/17   0355   ALTSGPT  49  134*  79*   ASTSGOT  34  100*  34   ALKPHOSPHAT  110*  296*   202*   TBILIRUBIN  0.6  0.7  0.5   DBILIRUBIN  0.1   --    --    LIPASE  76*  80*  79*   GLUCOSE  102*  93  116*     Recent Labs      01/20/17   0300  01/21/17   0355   RBC  4.92  4.59*   HEMOGLOBIN  13.0*  12.0*   HEMATOCRIT  38.5*  36.7*   PLATELETCT  245  224     Recent Labs      01/19/17   0430  01/20/17   0300  01/21/17   0355   WBC   --   7.2  6.2   NEUTSPOLYS   --   56.90  50.20   LYMPHOCYTES   --   29.90  35.90   MONOCYTES   --   10.00  9.60   EOSINOPHILS   --   1.80  2.20   BASOPHILS   --   0.60  1.00   ASTSGOT  34  100*  34   ALTSGPT  49  134*  79*   ALKPHOSPHAT  110*  296*  202*   TBILIRUBIN  0.6  0.7  0.5   CT A/P 1/19    Interval removal of the previously noted pigtail catheters.    Small residual perihepatic collection.    Interval decrease in size of the fluid collection anterior to the left lobe of the liver measuring 2.1 x 1 cm.    Collection in the gallbladder fossa measures 2 x 3.9 cm.    Interval decrease in size of the fluid collection in the anterior right pararenal space.    Mild stranding about the second portion of the duodenum may be related to pancreatitis or can be seen in duodenitis/peptic ulcer disease.    Pneumobilia with biliary stent noted. Mild biliary ductal dilatation.    Minimal urothelial thickening may be infectious or inflammatory. No hydronephrosis is seen.    Moderate amount of colonic stool.    Small right pleural effusion with overlying atelectasis/consolidation is decreased in size compared to prior.    Medical Decision Making, by Problem:  AP   -improved, LFTs improved   -GI recommended follow clinically-no concerning findings for intervention   -no signs of infection, DC abx   -eating, DC TPN  S/p cholecystectomy PTA  Bile leak treated with biliary stent  Biloma s/p IR drainage infected with candida-abx completed  Vanco induced ARF-resolved  R thoracentesis hx  Anemia  HTN  Mild hyponatremia  HCV  Nutrition   -po   -DC TPN    Medications reviewed and Labs  reviewed  [place] catheter: No [place]  Central line in place: TPN    DVT Prophylaxis: Heparin

## 2017-01-22 PROCEDURE — 99232 SBSQ HOSP IP/OBS MODERATE 35: CPT | Performed by: HOSPITALIST

## 2017-01-22 ASSESSMENT — ENCOUNTER SYMPTOMS
CONSTIPATION: 0
NAUSEA: 0
COUGH: 0
BACK PAIN: 0
HEADACHES: 0
ABDOMINAL PAIN: 1
FEVER: 0
SHORTNESS OF BREATH: 0
DIARRHEA: 0
VOMITING: 0
DIZZINESS: 0

## 2017-01-22 NOTE — [facility]
Hospital Medicine Progress Note, Adult, Complex               Author: Paige Farrell Date & Time created: 1/22/2017  2:40 PM     CC: bile leak    Interval History:  Eating without difficulty  Pain persists but unchanged with food  No associated nausea    Review of Systems:  Review of Systems   Constitutional: Negative for fever.   Respiratory: Negative for cough and shortness of breath.    Cardiovascular: Negative for chest pain.   Gastrointestinal: Positive for abdominal pain (RUQ/epigastric pain, not worse with eating). Negative for nausea, vomiting, diarrhea and constipation.   Musculoskeletal: Negative for back pain.   Neurological: Negative for dizziness and headaches.       T 98.1P75 /[id number] 95% ra I/O1.4/brp no BM   Physical Exam   Constitutional: He is oriented to person, place, and time. He appears well-developed.   HENT:   Head: Normocephalic and atraumatic.   Eyes: Conjunctivae are normal. Right eye exhibits no discharge. Left eye exhibits no discharge. No scleral icterus.   Neck: No tracheal deviation present.   Cardiovascular: Regular rhythm and intact distal pulses.    No murmur heard.  Pulmonary/Chest: Effort normal. No respiratory distress. He has no wheezes. He exhibits no tenderness.   Abdominal: Soft. Bowel sounds are normal. He exhibits no distension. There is tenderness (RUQ/epigastrum). There is no rebound and no guarding.   Musculoskeletal: He exhibits no edema.   Neurological: He is alert and oriented to person, place, and time.   Skin: Skin is warm.   Vitals reviewed.      Labs:        Invalid input(s): [id number]      Recent Labs      01/20/17   0300  01/21/17   0355   SODIUM  133*  135   POTASSIUM  4.5  4.4   CHLORIDE  101  105   CO2  21  21   BUN  21  19   CREATININE  1.04  0.99   MAGNESIUM  1.8  1.8   PHOSPHORUS  5.1*  3.7   CALCIUM  9.6  9.6     Recent Labs      01/20/17   0300  01/21/17   0355   ALTSGPT  134*  79*   ASTSGOT  100*  34   ALKPHOSPHAT  296*  202*    TBILIRUBIN  0.7  0.5   LIPASE  80*  79*   GLUCOSE  93  116*     Recent Labs      01/20/17   0300  01/21/17   0355   RBC  4.92  4.59*   HEMOGLOBIN  13.0*  12.0*   HEMATOCRIT  38.5*  36.7*   PLATELETCT  245  224     Recent Labs      01/20/17   0300  01/21/17   0355   WBC  7.2  6.2   NEUTSPOLYS  56.90  50.20   LYMPHOCYTES  29.90  35.90   MONOCYTES  10.00  9.60   EOSINOPHILS  1.80  2.20   BASOPHILS  0.60  1.00   ASTSGOT  100*  34   ALTSGPT  134*  79*   ALKPHOSPHAT  296*  202*   TBILIRUBIN  0.7  0.5       Medical Decision Making, by Problem:  AP   -improved, LFTs improved   -GI recommended follow clinically-no concerning findings for intervention   -increase PPI to bid, add carafate and check H. Pylori   -follow up am labs, lipase   -non-toxic  S/p cholecystectomy PTA  Bile leak treated with biliary stent  Biloma s/p IR drainage infected with candida-abx completed  Vanco induced ARF-resolved  R thoracentesis hx  Anemia  HTN  Mild hyponatremia  HCV  Nutrition   -po    Medications reviewed  [place] catheter: No [place]  Central line in place: TPN    DVT Prophylaxis: Heparin

## 2017-01-23 LAB
ALBUMIN SERPL BCP-MCNC: 3.5 G/DL (ref 3.2–4.9)
ALBUMIN/GLOB SERPL: 0.9 G/DL
ALP SERPL-CCNC: 154 U/L (ref 30–99)
ALT SERPL-CCNC: 45 U/L (ref 2–50)
ANION GAP SERPL CALC-SCNC: 5 MMOL/L (ref 0–11.9)
AST SERPL-CCNC: 21 U/L (ref 12–45)
BASOPHILS # BLD AUTO: 0.7 % (ref 0–1.8)
BASOPHILS # BLD: 0.05 K/UL (ref 0–0.12)
BILIRUB SERPL-MCNC: 0.4 MG/DL (ref 0.1–1.5)
BUN SERPL-MCNC: 18 MG/DL (ref 8–22)
CALCIUM SERPL-MCNC: 9.2 MG/DL (ref 8.4–10.2)
CHLORIDE SERPL-SCNC: 105 MMOL/L (ref 96–112)
CO2 SERPL-SCNC: 22 MMOL/L (ref 20–33)
CREAT SERPL-MCNC: 1.04 MG/DL (ref 0.5–1.4)
EOSINOPHIL # BLD AUTO: 0.1 K/UL (ref 0–0.51)
EOSINOPHIL NFR BLD: 1.4 % (ref 0–6.9)
ERYTHROCYTE [DISTWIDTH] IN BLOOD BY AUTOMATED COUNT: 40 FL (ref 35.9–50)
GFR SERPL CREATININE-BSD FRML MDRD: >60 ML/MIN/1.73 M 2
GLOBULIN SER CALC-MCNC: 4 G/DL (ref 1.9–3.5)
GLUCOSE SERPL-MCNC: 100 MG/DL (ref 65–99)
HCT VFR BLD AUTO: 33.7 % (ref 42–52)
HGB BLD-MCNC: 11.4 G/DL (ref 14–18)
IMM GRANULOCYTES # BLD AUTO: 0.07 K/UL (ref 0–0.11)
IMM GRANULOCYTES NFR BLD AUTO: 1 % (ref 0–0.9)
LIPASE SERPL-CCNC: 72 U/L (ref 7–58)
LYMPHOCYTES # BLD AUTO: 2.33 K/UL (ref 1–4.8)
LYMPHOCYTES NFR BLD: 32.7 % (ref 22–41)
MCH RBC QN AUTO: 26.7 PG (ref 27–33)
MCHC RBC AUTO-ENTMCNC: 33.8 G/DL (ref 33.7–35.3)
MCV RBC AUTO: 78.9 FL (ref 81.4–97.8)
MONOCYTES # BLD AUTO: 0.69 K/UL (ref 0–0.85)
MONOCYTES NFR BLD AUTO: 9.7 % (ref 0–13.4)
NEUTROPHILS # BLD AUTO: 3.88 K/UL (ref 1.82–7.42)
NEUTROPHILS NFR BLD: 54.5 % (ref 44–72)
NRBC # BLD AUTO: 0 K/UL
NRBC BLD AUTO-RTO: 0 /100 WBC
PLATELET # BLD AUTO: 200 K/UL (ref 164–446)
PMV BLD AUTO: 9.8 FL (ref 9–12.9)
POTASSIUM SERPL-SCNC: 4 MMOL/L (ref 3.6–5.5)
PROT SERPL-MCNC: 7.5 G/DL (ref 6–8.2)
RBC # BLD AUTO: 4.27 M/UL (ref 4.7–6.1)
SODIUM SERPL-SCNC: 132 MMOL/L (ref 135–145)
WBC # BLD AUTO: 7.1 K/UL (ref 4.8–10.8)

## 2017-01-23 PROCEDURE — 80053 COMPREHEN METABOLIC PANEL: CPT

## 2017-01-23 PROCEDURE — 83690 ASSAY OF LIPASE: CPT

## 2017-01-23 PROCEDURE — 99232 SBSQ HOSP IP/OBS MODERATE 35: CPT | Performed by: HOSPITALIST

## 2017-01-23 PROCEDURE — 85025 COMPLETE CBC W/AUTO DIFF WBC: CPT

## 2017-01-23 ASSESSMENT — ENCOUNTER SYMPTOMS
CONSTIPATION: 0
ABDOMINAL PAIN: 1
PALPITATIONS: 0
FEVER: 1
SHORTNESS OF BREATH: 0
NAUSEA: 0
VOMITING: 0
COUGH: 0
DIARRHEA: 0
HEADACHES: 0
BACK PAIN: 0
DIZZINESS: 0

## 2017-01-23 NOTE — [facility]
Hospital Medicine Progress Note, Adult, Complex               Author: Paige CORREA Jami Date & Time created: 1/23/2017  9:10 AM     CC: bile leak    Interval History:  Low grade temp, wbc normal  No change in pain, tolerating diet    Review of Systems:  Review of Systems   Constitutional: Positive for fever (low grade).   Respiratory: Negative for cough and shortness of breath.    Cardiovascular: Negative for chest pain and palpitations.   Gastrointestinal: Positive for abdominal pain (RUQ). Negative for nausea, vomiting, diarrhea and constipation.   Musculoskeletal: Negative for back pain.   Neurological: Negative for dizziness and headaches.       Tm 100.1P70 /78YL55Am11 98% ra I/O3.1/brp  1BM   Physical Exam   Constitutional: He is oriented to person, place, and time. He appears well-developed. No distress.   HENT:   Head: Normocephalic.   Eyes: Conjunctivae are normal. Right eye exhibits no discharge. Left eye exhibits no discharge. No scleral icterus.   Neck: No tracheal deviation present.   Cardiovascular: Regular rhythm and intact distal pulses.    No murmur heard.  Pulmonary/Chest: Effort normal. No respiratory distress. He has no wheezes. He exhibits no tenderness.   Abdominal: Soft. Bowel sounds are normal. He exhibits no distension. There is tenderness (RUQ/epigastrum). There is no rebound and no guarding.   Musculoskeletal: He exhibits no edema.   Neurological: He is alert and oriented to person, place, and time.   Skin: Skin is warm.   Vitals reviewed.      Labs:        Invalid input(s): [id number]      Recent Labs      01/21/17   0355  01/23/17   0400   SODIUM  135  132*   POTASSIUM  4.4  4.0   CHLORIDE  105  105   CO2  21  22   BUN  19  18   CREATININE  0.99  1.04   MAGNESIUM  1.8   --    PHOSPHORUS  3.7   --    CALCIUM  9.6  9.2     Recent Labs      01/21/17   0355  01/23/17   0400   ALTSGPT  79*  45   ASTSGOT  34  21   ALKPHOSPHAT  202*  154*   TBILIRUBIN  0.5  0.4   LIPASE  79*  72*    GLUCOSE  116*  100*     Recent Labs      01/21/17   0355  01/23/17   0400   RBC  4.59*  4.27*   HEMOGLOBIN  12.0*  11.4*   HEMATOCRIT  36.7*  33.7*   PLATELETCT  224  200     Recent Labs      01/21/17   0355  01/23/17   0400   WBC  6.2  7.1   NEUTSPOLYS  50.20  54.50   LYMPHOCYTES  35.90  32.70   MONOCYTES  9.60  9.70   EOSINOPHILS  2.20  1.40   BASOPHILS  1.00  0.70   ASTSGOT  34  21   ALTSGPT  79*  45   ALKPHOSPHAT  202*  154*   TBILIRUBIN  0.5  0.4       Medical Decision Making, by Problem:  AP   -LFTs improved   -GI recommended follow clinically-no concerning findings for intervention   -continue PPI, carafate   -await H. pylori   -non-toxic  S/p cholecystectomy PTA  Bile leak treated with biliary stent  Biloma s/p IR drainage infected with candida-abx completed  Vanco induced ARF-resolved  R thoracentesis hx  Anemia  Mild hyponatremia  HCV  Nutrition   -po    Medications reviewed and Labs reviewed  [place] catheter: No [place]      DVT Prophylaxis: Heparin

## 2017-01-24 LAB
BASOPHILS # BLD AUTO: 1.1 % (ref 0–1.8)
BASOPHILS # BLD: 0.06 K/UL (ref 0–0.12)
EOSINOPHIL # BLD AUTO: 0.15 K/UL (ref 0–0.51)
EOSINOPHIL NFR BLD: 2.7 % (ref 0–6.9)
ERYTHROCYTE [DISTWIDTH] IN BLOOD BY AUTOMATED COUNT: 39.6 FL (ref 35.9–50)
HCT VFR BLD AUTO: 33.5 % (ref 42–52)
HGB BLD-MCNC: 11.4 G/DL (ref 14–18)
IMM GRANULOCYTES # BLD AUTO: 0.04 K/UL (ref 0–0.11)
IMM GRANULOCYTES NFR BLD AUTO: 0.7 % (ref 0–0.9)
LIPASE SERPL-CCNC: 76 U/L (ref 7–58)
LYMPHOCYTES # BLD AUTO: 2.21 K/UL (ref 1–4.8)
LYMPHOCYTES NFR BLD: 39.9 % (ref 22–41)
MCH RBC QN AUTO: 26.5 PG (ref 27–33)
MCHC RBC AUTO-ENTMCNC: 34 G/DL (ref 33.7–35.3)
MCV RBC AUTO: 77.9 FL (ref 81.4–97.8)
MONOCYTES # BLD AUTO: 0.49 K/UL (ref 0–0.85)
MONOCYTES NFR BLD AUTO: 8.8 % (ref 0–13.4)
NEUTROPHILS # BLD AUTO: 2.59 K/UL (ref 1.82–7.42)
NEUTROPHILS NFR BLD: 46.8 % (ref 44–72)
NRBC # BLD AUTO: 0 K/UL
NRBC BLD AUTO-RTO: 0 /100 WBC
PLATELET # BLD AUTO: 200 K/UL (ref 164–446)
PMV BLD AUTO: 9.8 FL (ref 9–12.9)
RBC # BLD AUTO: 4.3 M/UL (ref 4.7–6.1)
WBC # BLD AUTO: 5.5 K/UL (ref 4.8–10.8)

## 2017-01-24 PROCEDURE — 83690 ASSAY OF LIPASE: CPT

## 2017-01-24 PROCEDURE — 99232 SBSQ HOSP IP/OBS MODERATE 35: CPT | Performed by: HOSPITALIST

## 2017-01-24 PROCEDURE — 85025 COMPLETE CBC W/AUTO DIFF WBC: CPT

## 2017-01-24 PROCEDURE — 87338 HPYLORI STOOL AG IA: CPT

## 2017-01-24 ASSESSMENT — ENCOUNTER SYMPTOMS
PALPITATIONS: 0
EYES NEGATIVE: 1
ABDOMINAL PAIN: 0
VOMITING: 0
NAUSEA: 0
FEVER: 0
COUGH: 0
CHILLS: 0
SHORTNESS OF BREATH: 0
MUSCULOSKELETAL NEGATIVE: 1

## 2017-01-25 ENCOUNTER — APPOINTMENT (OUTPATIENT)
Dept: RADIOLOGY | Facility: MEDICAL CENTER | Age: 27
End: 2017-01-25
Attending: HOSPITALIST
Payer: COMMERCIAL

## 2017-01-25 PROCEDURE — 99232 SBSQ HOSP IP/OBS MODERATE 35: CPT | Performed by: HOSPITALIST

## 2017-01-25 PROCEDURE — 71010 DX-CHEST-PORTABLE (1 VIEW): CPT

## 2017-01-25 ASSESSMENT — ENCOUNTER SYMPTOMS
SHORTNESS OF BREATH: 0
EYES NEGATIVE: 1
CHILLS: 0
COUGH: 0
VOMITING: 0
MUSCULOSKELETAL NEGATIVE: 1
NAUSEA: 0
ABDOMINAL PAIN: 1
PALPITATIONS: 0
FEVER: 0

## 2017-01-25 NOTE — [facility]
Hospital Medicine Progress Note, Adult, Complex               Author: Keisha Chandler Date & Time created: 1/24/2017  8:45 PM     Interval History:  CC - infected biloma  Denied N/V/abd pain to me but reported pain to RN.    Review of Systems:  Review of Systems   Constitutional: Negative for fever and chills.   HENT: Negative.    Eyes: Negative.    Respiratory: Negative for cough and shortness of breath.    Cardiovascular: Negative for chest pain and palpitations.   Gastrointestinal: Negative for nausea, vomiting and abdominal pain.   Genitourinary: Negative.    Musculoskeletal: Negative.    Skin: Negative for itching and rash.   Endo/Heme/Allergies: Negative.        Physical Exam:  Physical Exam   Constitutional: He is oriented to person, place, and time. He appears well-developed and well-nourished. No distress.   HENT:   Head: Normocephalic and atraumatic.   Right Ear: External ear normal.   Left Ear: External ear normal.   Nose: Nose normal.   Mouth/Throat: Oropharynx is clear and moist.   Eyes: Conjunctivae and EOM are normal. Right eye exhibits no discharge. Left eye exhibits no discharge.   Neck: Normal range of motion. Neck supple. No tracheal deviation present.   Cardiovascular: Normal rate and regular rhythm.    SR   Pulmonary/Chest: Effort normal and breath sounds normal. No stridor. No respiratory distress. He has no wheezes.   Abdominal: Soft. Bowel sounds are normal. He exhibits no distension. There is no tenderness. There is no rebound and no guarding.   Musculoskeletal: He exhibits no edema or tenderness.   Neurological: He is alert and oriented to person, place, and time.   Skin: Skin is warm and dry. He is not diaphoretic.   Vitals reviewed.      Labs:        Invalid input(s): [id number]      Recent Labs      01/23/17   0400   SODIUM  132*   POTASSIUM  4.0   CHLORIDE  105   CO2  22   BUN  18   CREATININE  1.04   CALCIUM  9.2     Recent Labs      01/23/17   0400  01/24/17   0055   ALTSGPT  45   --     ASTSGOT  21   --    ALKPHOSPHAT  154*   --    TBILIRUBIN  0.4   --    LIPASE  72*  76*   GLUCOSE  100*   --      Recent Labs      01/23/17   0400  01/24/17   0055   RBC  4.27*  4.30*   HEMOGLOBIN  11.4*  11.4*   HEMATOCRIT  33.7*  33.5*   PLATELETCT  200  200     Recent Labs      01/23/17   0400  01/24/17   0055   WBC  7.1  5.5   NEUTSPOLYS  54.50  46.80   LYMPHOCYTES  32.70  39.90   MONOCYTES  9.70  8.80   EOSINOPHILS  1.40  2.70   BASOPHILS  0.70  1.10   ASTSGOT  21   --    ALTSGPT  45   --    ALKPHOSPHAT  154*   --    TBILIRUBIN  0.4   --              Medical Decision Making, by Problem:  S/P RECENT CHOLECYSTECTOMY  BILE LEAK S/P BILIARY STENT - stent removal 2/21/17 per GI  YEAST INFECTED BILOMA S/P IR DRAINAGE - completed Fluconazole/Unasyn 1/15/17 per ID  S/P RIGHT THORACENTESIS - check F/U CXR off Lasix  S/P BILAT HYDRONEPHROSIS  HTN - normalized as overall condition improves  ASYMPTOMATIC BRADYCARDIA - as above  S/P KALEB - 2/2 Vanco, Nephro s/o  ANEMIA - may be able to D/C Fe/Vit C soon  TRANSAMINITIS - follow LFT's off TPN  HCV  CONSTIPATION - needs Docusate while on narcotics  SPCM - taking PO well, off TPN  DEBILITY - PT/OT    Reviewed w/ pt, Guards, and RN        Medications reviewed and Labs reviewed  [place] catheter: No [place]  Central line in place: Need for access    DVT Prophylaxis: Heparin    Ulcer prophylaxis: Yes

## 2017-01-26 LAB — H PYLORI AG STL QL IA: NEGATIVE

## 2017-01-26 PROCEDURE — 99232 SBSQ HOSP IP/OBS MODERATE 35: CPT | Performed by: HOSPITALIST

## 2017-01-26 ASSESSMENT — ENCOUNTER SYMPTOMS
VOMITING: 0
EYES NEGATIVE: 1
NAUSEA: 0
CHILLS: 0
PALPITATIONS: 0
COUGH: 0
MUSCULOSKELETAL NEGATIVE: 1
SHORTNESS OF BREATH: 0
FEVER: 0
ABDOMINAL PAIN: 1

## 2017-01-26 NOTE — [facility]
Hospital Medicine Progress Note, Adult, Complex               Author: Keisha Geraldine Date & Time created: 1/25/2017  7:27 PM     Interval History:  CC - infected biloma  No nausea but has intermittent abd pain.    Review of Systems:  Review of Systems   Constitutional: Negative for fever and chills.   HENT: Negative.    Eyes: Negative.    Respiratory: Negative for cough and shortness of breath.    Cardiovascular: Negative for chest pain and palpitations.   Gastrointestinal: Positive for abdominal pain. Negative for nausea and vomiting.   Genitourinary: Negative.    Musculoskeletal: Negative.    Skin: Negative for itching and rash.   Endo/Heme/Allergies: Negative.        Physical Exam:  Physical Exam   Constitutional: He is oriented to person, place, and time. He appears well-developed and well-nourished. No distress.   HENT:   Head: Normocephalic and atraumatic.   Right Ear: External ear normal.   Left Ear: External ear normal.   Nose: Nose normal.   Mouth/Throat: Oropharynx is clear and moist.   Eyes: Conjunctivae and EOM are normal. Right eye exhibits no discharge. Left eye exhibits no discharge.   Neck: Normal range of motion. Neck supple. No tracheal deviation present.   Cardiovascular: Normal rate and regular rhythm.    Pulmonary/Chest: Effort normal and breath sounds normal. No stridor. No respiratory distress. He has no wheezes.   Abdominal: Soft. Bowel sounds are normal. He exhibits no distension. There is no tenderness. There is no rebound and no guarding.   Musculoskeletal: He exhibits no edema or tenderness.   Neurological: He is alert and oriented to person, place, and time.   Skin: Skin is warm and dry. He is not diaphoretic.   Vitals reviewed.      Labs:        Invalid input(s): [id number]      Recent Labs      01/23/17   0400   SODIUM  132*   POTASSIUM  4.0   CHLORIDE  105   CO2  22   BUN  18   CREATININE  1.04   CALCIUM  9.2     Recent Labs      01/23/17   0400  01/24/17   0055   ALTSGPT  45   --     ASTSGOT  21   --    ALKPHOSPHAT  154*   --    TBILIRUBIN  0.4   --    LIPASE  72*  76*   GLUCOSE  100*   --      Recent Labs      01/23/17   0400  01/24/17   0055   RBC  4.27*  4.30*   HEMOGLOBIN  11.4*  11.4*   HEMATOCRIT  33.7*  33.5*   PLATELETCT  200  200     Recent Labs      01/23/17   0400  01/24/17   0055   WBC  7.1  5.5   NEUTSPOLYS  54.50  46.80   LYMPHOCYTES  32.70  39.90   MONOCYTES  9.70  8.80   EOSINOPHILS  1.40  2.70   BASOPHILS  0.70  1.10   ASTSGOT  21   --    ALTSGPT  45   --    ALKPHOSPHAT  154*   --    TBILIRUBIN  0.4   --              Medical Decision Making, by Problem:  S/P RECENT CHOLECYSTECTOMY  BILE LEAK S/P BILIARY STENT - stent removal 2/2/17 per [facility]  YEAST INFECTED BILOMA S/P IR DRAINAGE - completed Fluconazole/Unasyn 1/15/17 per ID  RECURRENT RIGHT EFFUSION S/P PRIOR THORACENTESIS - F/U CXR improved w/ Lasix and off TPN  S/P BILAT HYDRONEPHROSIS  HTN - normalized as overall condition improves  ASYMPTOMATIC BRADYCARDIA - as above  S/P KALEB - 2/2 Vanco, Nephro s/o  ANEMIA - may be able to D/C Fe/Vit C soon  TRANSAMINITIS - follow LFT's off TPN  HCV  CONSTIPATION - resolved  SPCM - taking PO well, off TPN  DEBILITY - PT/OT    Reviewed w/ pt, Guards, and staff        Medications reviewed and Labs reviewed  [place] catheter: No [place]  Central line in place: Need for access    DVT Prophylaxis: Heparin    Ulcer prophylaxis: Yes

## 2017-01-27 LAB
ALBUMIN SERPL BCP-MCNC: 3.6 G/DL (ref 3.2–4.9)
ALBUMIN/GLOB SERPL: 0.9 G/DL
ALP SERPL-CCNC: 134 U/L (ref 30–99)
ALT SERPL-CCNC: 36 U/L (ref 2–50)
ANION GAP SERPL CALC-SCNC: 8 MMOL/L (ref 0–11.9)
AST SERPL-CCNC: 26 U/L (ref 12–45)
BASOPHILS # BLD AUTO: 0.6 % (ref 0–1.8)
BASOPHILS # BLD: 0.04 K/UL (ref 0–0.12)
BILIRUB SERPL-MCNC: 0.5 MG/DL (ref 0.1–1.5)
BUN SERPL-MCNC: 18 MG/DL (ref 8–22)
CALCIUM SERPL-MCNC: 9.6 MG/DL (ref 8.4–10.2)
CHLORIDE SERPL-SCNC: 101 MMOL/L (ref 96–112)
CO2 SERPL-SCNC: 26 MMOL/L (ref 20–33)
CREAT SERPL-MCNC: 0.88 MG/DL (ref 0.5–1.4)
EOSINOPHIL # BLD AUTO: 0.23 K/UL (ref 0–0.51)
EOSINOPHIL NFR BLD: 3.3 % (ref 0–6.9)
ERYTHROCYTE [DISTWIDTH] IN BLOOD BY AUTOMATED COUNT: 39.2 FL (ref 35.9–50)
GFR SERPL CREATININE-BSD FRML MDRD: >60 ML/MIN/1.73 M 2
GLOBULIN SER CALC-MCNC: 4 G/DL (ref 1.9–3.5)
GLUCOSE SERPL-MCNC: 98 MG/DL (ref 65–99)
HCT VFR BLD AUTO: 35 % (ref 42–52)
HGB BLD-MCNC: 12.1 G/DL (ref 14–18)
IMM GRANULOCYTES # BLD AUTO: 0.04 K/UL (ref 0–0.11)
IMM GRANULOCYTES NFR BLD AUTO: 0.6 % (ref 0–0.9)
LYMPHOCYTES # BLD AUTO: 2.13 K/UL (ref 1–4.8)
LYMPHOCYTES NFR BLD: 30.6 % (ref 22–41)
MCH RBC QN AUTO: 26.6 PG (ref 27–33)
MCHC RBC AUTO-ENTMCNC: 34.6 G/DL (ref 33.7–35.3)
MCV RBC AUTO: 76.9 FL (ref 81.4–97.8)
MONOCYTES # BLD AUTO: 0.58 K/UL (ref 0–0.85)
MONOCYTES NFR BLD AUTO: 8.3 % (ref 0–13.4)
NEUTROPHILS # BLD AUTO: 3.94 K/UL (ref 1.82–7.42)
NEUTROPHILS NFR BLD: 56.6 % (ref 44–72)
NRBC # BLD AUTO: 0 K/UL
NRBC BLD AUTO-RTO: 0 /100 WBC
PLATELET # BLD AUTO: 230 K/UL (ref 164–446)
PMV BLD AUTO: 9.3 FL (ref 9–12.9)
POTASSIUM SERPL-SCNC: 4.3 MMOL/L (ref 3.6–5.5)
PROT SERPL-MCNC: 7.6 G/DL (ref 6–8.2)
RBC # BLD AUTO: 4.55 M/UL (ref 4.7–6.1)
SODIUM SERPL-SCNC: 135 MMOL/L (ref 135–145)
WBC # BLD AUTO: 7 K/UL (ref 4.8–10.8)

## 2017-01-27 PROCEDURE — 99232 SBSQ HOSP IP/OBS MODERATE 35: CPT | Performed by: HOSPITALIST

## 2017-01-27 PROCEDURE — 80053 COMPREHEN METABOLIC PANEL: CPT

## 2017-01-27 PROCEDURE — 85025 COMPLETE CBC W/AUTO DIFF WBC: CPT

## 2017-01-27 ASSESSMENT — ENCOUNTER SYMPTOMS
VOMITING: 0
EYES NEGATIVE: 1
ABDOMINAL PAIN: 1
SHORTNESS OF BREATH: 0
FEVER: 0
MUSCULOSKELETAL NEGATIVE: 1
CHILLS: 0
PALPITATIONS: 0
COUGH: 0
NAUSEA: 0

## 2017-01-27 NOTE — [facility]
Hospital Medicine Progress Note, Adult, Complex               Author: Valdes Geraldine Date & Time created: 1/26/2017  9:31 PM     Interval History:  CC - infected biloma  Continues to have intermittent abd pain.    Review of Systems:  Review of Systems   Constitutional: Negative for fever and chills.   HENT: Negative.    Eyes: Negative.    Respiratory: Negative for cough and shortness of breath.    Cardiovascular: Negative for chest pain and palpitations.   Gastrointestinal: Positive for abdominal pain. Negative for nausea and vomiting.   Genitourinary: Negative.    Musculoskeletal: Negative.    Skin: Negative for itching and rash.   Endo/Heme/Allergies: Negative.        Physical Exam:  Physical Exam   Constitutional: He is oriented to person, place, and time. He appears well-developed and well-nourished. No distress.   HENT:   Head: Normocephalic and atraumatic.   Right Ear: External ear normal.   Left Ear: External ear normal.   Nose: Nose normal.   Mouth/Throat: Oropharynx is clear and moist.   Eyes: Conjunctivae and EOM are normal. Right eye exhibits no discharge. Left eye exhibits no discharge.   Neck: Normal range of motion. Neck supple. No tracheal deviation present.   Cardiovascular: Normal rate and regular rhythm.    Pulmonary/Chest: Effort normal and breath sounds normal. No stridor. No respiratory distress. He has no wheezes.   Abdominal: Soft. Bowel sounds are normal. He exhibits no distension. There is no tenderness. There is no rebound and no guarding.   Musculoskeletal: He exhibits no edema or tenderness.   Neurological: He is alert and oriented to person, place, and time.   Skin: Skin is warm and dry. He is not diaphoretic.   Vitals reviewed.      Labs:        Invalid input(s): [id number]      No results for input(s): SODIUM, POTASSIUM, CHLORIDE, CO2, BUN, CREATININE, MAGNESIUM, PHOSPHORUS, CALCIUM in the last 72 hours.  Recent Labs      01/24/17   0055   LIPASE  76*     Recent Labs      01/24/17   0055    RBC  4.30*   HEMOGLOBIN  11.4*   HEMATOCRIT  33.5*   PLATELETCT  200     Recent Labs      01/24/17   0055   WBC  5.5   NEUTSPOLYS  46.80   LYMPHOCYTES  39.90   MONOCYTES  8.80   EOSINOPHILS  2.70   BASOPHILS  1.10             Medical Decision Making, by Problem:  S/P RECENT CHOLECYSTECTOMY  BILE LEAK S/P BILIARY STENT - stent removal 2/2/17 per [facility]  YEAST INFECTED BILOMA S/P IR DRAINAGE - completed Fluconazole/Unasyn 1/15/17 per ID  RECURRENT RIGHT EFFUSION S/P PRIOR THORACENTESIS - F/U CXR improved w/ Lasix and off TPN  S/P BILAT HYDRONEPHROSIS  HTN - normalized as overall condition improves  ASYMPTOMATIC BRADYCARDIA - as above  S/P KALEB - 2/2 Vanco, Nephro s/o  ANEMIA - may be able to D/C Fe/Vit C soon  TRANSAMINITIS - LFT's improving off TPN  HCV  PAIN CONTROL - try to wean off Fentanyl Patch, increase Oxycontin  CONSTIPATION - resolved  SPCM - taking PO well, off TPN  DEBILITY - PT/OT    Reviewed w/ pt, Guards, and alf MD        Medications reviewed and Labs reviewed  [place] catheter: No [place]  Central line in place: Need for access    DVT Prophylaxis: Heparin    Ulcer prophylaxis: Yes

## 2017-01-28 PROCEDURE — 99232 SBSQ HOSP IP/OBS MODERATE 35: CPT | Performed by: HOSPITALIST

## 2017-01-28 ASSESSMENT — ENCOUNTER SYMPTOMS
FEVER: 0
MUSCULOSKELETAL NEGATIVE: 1
EYES NEGATIVE: 1
NAUSEA: 0
SHORTNESS OF BREATH: 0
ABDOMINAL PAIN: 1
PALPITATIONS: 0
COUGH: 0
CHILLS: 0
VOMITING: 0

## 2017-01-28 NOTE — [facility]
Hospital Medicine Progress Note, Adult, Complex               Author: Keisha Geraldine Date & Time created: 1/27/2017  7:16 PM     Interval History:  CC - infected biloma  Slowly feeling better everyday.  No new problems.    Review of Systems:  Review of Systems   Constitutional: Negative for fever and chills.   HENT: Negative.    Eyes: Negative.    Respiratory: Negative for cough and shortness of breath.    Cardiovascular: Negative for chest pain and palpitations.   Gastrointestinal: Positive for abdominal pain. Negative for nausea and vomiting.   Genitourinary: Negative.    Musculoskeletal: Negative.    Skin: Negative for itching and rash.   Endo/Heme/Allergies: Negative.        Physical Exam:  Physical Exam   Constitutional: He is oriented to person, place, and time. He appears well-developed and well-nourished. No distress.   HENT:   Head: Normocephalic and atraumatic.   Right Ear: External ear normal.   Left Ear: External ear normal.   Nose: Nose normal.   Mouth/Throat: Oropharynx is clear and moist. No oropharyngeal exudate.   Eyes: Conjunctivae and EOM are normal. Right eye exhibits no discharge. Left eye exhibits no discharge.   Neck: Normal range of motion. Neck supple. No tracheal deviation present.   Cardiovascular: Normal rate and regular rhythm.    Pulmonary/Chest: Effort normal and breath sounds normal. No stridor. No respiratory distress. He has no wheezes.   Abdominal: Soft. Bowel sounds are normal. He exhibits no distension. There is no tenderness. There is no rebound and no guarding.   Musculoskeletal: He exhibits no edema or tenderness.   Neurological: He is alert and oriented to person, place, and time.   Skin: Skin is warm and dry. He is not diaphoretic.   Vitals reviewed.      Labs:        Invalid input(s): [id number]      Recent Labs      01/27/17   0600   SODIUM  135   POTASSIUM  4.3   CHLORIDE  101   CO2  26   BUN  18   CREATININE  0.88   CALCIUM  9.6     Recent Labs      01/27/17   0600    ALTSGPT  36   ASTSGOT  26   ALKPHOSPHAT  134*   TBILIRUBIN  0.5   GLUCOSE  98     Recent Labs      01/27/17   0600   RBC  4.55*   HEMOGLOBIN  12.1*   HEMATOCRIT  35.0*   PLATELETCT  230     Recent Labs      01/27/17   0600   WBC  7.0   NEUTSPOLYS  56.60   LYMPHOCYTES  30.60   MONOCYTES  8.30   EOSINOPHILS  3.30   BASOPHILS  0.60   ASTSGOT  26   ALTSGPT  36   ALKPHOSPHAT  134*   TBILIRUBIN  0.5             Medical Decision Making, by Problem:  S/P RECENT CHOLECYSTECTOMY  BILE LEAK S/P BILIARY STENT - stent removal 2/2/17 per [facility]  YEAST INFECTED BILOMA S/P IR DRAINAGE - completed Fluconazole/Unasyn 1/15/17 per ID  RECURRENT RIGHT EFFUSION S/P PRIOR THORACENTESIS - F/U CXR improved  S/P BILAT HYDRONEPHROSIS  HTN - normalized as overall condition improves  ASYMPTOMATIC BRADYCARDIA - as above  S/P KALEB - 2/2 Vanco, Nephro s/o  ANEMIA - may be able to D/C Fe/Vit C soon  TRANSAMINITIS - LFT's improving off TPN  HCV  PAIN CONTROL - try to wean off Fentanyl Patch, increased Oxycontin  CONSTIPATION - resolved  SPCM - taking PO well, off TPN  DEBILITY - PT/OT    Reviewed w/ pt, Guards, and RN        Medications reviewed and Labs reviewed  [place] catheter: No [place]  Central line in place: Need for access    DVT Prophylaxis: Heparin    Ulcer prophylaxis: Yes

## 2017-01-29 PROCEDURE — 99232 SBSQ HOSP IP/OBS MODERATE 35: CPT | Performed by: HOSPITALIST

## 2017-01-29 ASSESSMENT — ENCOUNTER SYMPTOMS
CHILLS: 0
COUGH: 0
FEVER: 0
MUSCULOSKELETAL NEGATIVE: 1
EYES NEGATIVE: 1
PALPITATIONS: 0
NAUSEA: 0
SHORTNESS OF BREATH: 0
VOMITING: 0
ABDOMINAL PAIN: 1

## 2017-01-29 NOTE — [facility]
Hospital Medicine Progress Note, Adult, Complex               Author: Keisha Geraldine Date & Time created: 1/28/2017  4:52 PM     Interval History:  CC - infected biloma  No nausea, abd pain not too bad.    Review of Systems:  Review of Systems   Constitutional: Negative for fever and chills.   HENT: Negative.    Eyes: Negative.    Respiratory: Negative for cough and shortness of breath.    Cardiovascular: Negative for chest pain and palpitations.   Gastrointestinal: Positive for abdominal pain. Negative for nausea and vomiting.   Genitourinary: Negative.    Musculoskeletal: Negative.    Skin: Negative for itching and rash.   Endo/Heme/Allergies: Negative.        Physical Exam:  Physical Exam   Constitutional: He is oriented to person, place, and time. He appears well-developed and well-nourished. No distress.   HENT:   Head: Normocephalic and atraumatic.   Right Ear: External ear normal.   Left Ear: External ear normal.   Nose: Nose normal.   Mouth/Throat: Oropharynx is clear and moist. No oropharyngeal exudate.   Eyes: Conjunctivae and EOM are normal. Right eye exhibits no discharge. Left eye exhibits no discharge.   Neck: Normal range of motion. Neck supple. No tracheal deviation present.   Cardiovascular: Normal rate and regular rhythm.    Pulmonary/Chest: Effort normal and breath sounds normal. No stridor. No respiratory distress. He has no wheezes.   Abdominal: Soft. Bowel sounds are normal. He exhibits no distension. There is no tenderness. There is no rebound and no guarding.   Musculoskeletal: He exhibits no edema or tenderness.   Neurological: He is alert and oriented to person, place, and time.   Skin: Skin is warm and dry. He is not diaphoretic.   Vitals reviewed.      Labs:        Invalid input(s): [id number]      Recent Labs      01/27/17   0600   SODIUM  135   POTASSIUM  4.3   CHLORIDE  101   CO2  26   BUN  18   CREATININE  0.88   CALCIUM  9.6     Recent Labs      01/27/17   0600   ALTSGPT  36   ASTSGOT   26   ALKPHOSPHAT  134*   TBILIRUBIN  0.5   GLUCOSE  98     Recent Labs      01/27/17   0600   RBC  4.55*   HEMOGLOBIN  12.1*   HEMATOCRIT  35.0*   PLATELETCT  230     Recent Labs      01/27/17   0600   WBC  7.0   NEUTSPOLYS  56.60   LYMPHOCYTES  30.60   MONOCYTES  8.30   EOSINOPHILS  3.30   BASOPHILS  0.60   ASTSGOT  26   ALTSGPT  36   ALKPHOSPHAT  134*   TBILIRUBIN  0.5             Medical Decision Making, by Problem:  S/P RECENT CHOLECYSTECTOMY  BILE LEAK S/P BILIARY STENT - stent removal 2/2/17 per [facility]  YEAST INFECTED BILOMA S/P IR DRAINAGE - completed Fluconazole/Unasyn 1/15/17 per ID  RECURRENT RIGHT EFFUSION S/P PRIOR THORACENTESIS - F/U CXR improved  S/P BILAT HYDRONEPHROSIS  S/P KALEB - 2/2 Vanco, renal fxn recovered, Nephro s/o  ANEMIA - may be able to D/C Fe/Vit C soon  TRANSAMINITIS - LFT's improving off TPN  HCV  PAIN CONTROL - weaned off Fentanyl Patch, decreased Oxycodone and increased Oxycontin  CONSTIPATION - resolved  SPCM - taking PO well, off TPN  DEBILITY - PT/OT    Reviewed w/ pt, Guards, and RN        Medications reviewed and Labs reviewed  [place] catheter: No [place]  Central line in place: Need for access    DVT Prophylaxis: Heparin    Ulcer prophylaxis: Yes

## 2017-01-30 PROCEDURE — 99232 SBSQ HOSP IP/OBS MODERATE 35: CPT | Performed by: HOSPITALIST

## 2017-01-30 ASSESSMENT — ENCOUNTER SYMPTOMS
VOMITING: 0
NAUSEA: 0
ABDOMINAL PAIN: 1
SHORTNESS OF BREATH: 0
PALPITATIONS: 0
EYES NEGATIVE: 1
FEVER: 0
MUSCULOSKELETAL NEGATIVE: 1
CHILLS: 0
COUGH: 0

## 2017-01-30 NOTE — [facility]
Hospital Medicine Progress Note, Adult, Complex               Author: Keisha Chandler Date & Time created: 1/29/2017  5:54 PM     Interval History:  CC - infected biloma  No nausea, abd pain about the same.    Review of Systems:  Review of Systems   Constitutional: Negative for fever and chills.   HENT: Negative.    Eyes: Negative.    Respiratory: Negative for cough and shortness of breath.    Cardiovascular: Negative for chest pain and palpitations.   Gastrointestinal: Positive for abdominal pain. Negative for nausea and vomiting.   Genitourinary: Negative.    Musculoskeletal: Negative.    Skin: Negative for itching and rash.   Endo/Heme/Allergies: Negative.        Physical Exam:  Physical Exam   Constitutional: He is oriented to person, place, and time. He appears well-developed and well-nourished. No distress.   HENT:   Head: Normocephalic and atraumatic.   Right Ear: External ear normal.   Left Ear: External ear normal.   Nose: Nose normal.   Mouth/Throat: Oropharynx is clear and moist. No oropharyngeal exudate.   Eyes: Conjunctivae and EOM are normal. Right eye exhibits no discharge. Left eye exhibits no discharge.   Neck: Normal range of motion. Neck supple. No tracheal deviation present.   Cardiovascular: Normal rate and regular rhythm.    Pulmonary/Chest: Effort normal and breath sounds normal. No stridor. No respiratory distress. He has no wheezes.   Abdominal: Soft. Bowel sounds are normal. He exhibits no distension. There is no tenderness. There is no rebound and no guarding.   Musculoskeletal: He exhibits no edema or tenderness.   Neurological: He is alert and oriented to person, place, and time.   Skin: Skin is warm and dry. He is not diaphoretic.   Vitals reviewed.      Labs:        Invalid input(s): [id number]      Recent Labs      01/27/17   0600   SODIUM  135   POTASSIUM  4.3   CHLORIDE  101   CO2  26   BUN  18   CREATININE  0.88   CALCIUM  9.6     Recent Labs      01/27/17   0600   ALTSGPT  36    ASTSGOT  26   ALKPHOSPHAT  134*   TBILIRUBIN  0.5   GLUCOSE  98     Recent Labs      01/27/17   0600   RBC  4.55*   HEMOGLOBIN  12.1*   HEMATOCRIT  35.0*   PLATELETCT  230     Recent Labs      01/27/17   0600   WBC  7.0   NEUTSPOLYS  56.60   LYMPHOCYTES  30.60   MONOCYTES  8.30   EOSINOPHILS  3.30   BASOPHILS  0.60   ASTSGOT  26   ALTSGPT  36   ALKPHOSPHAT  134*   TBILIRUBIN  0.5             Medical Decision Making, by Problem:  S/P RECENT CHOLECYSTECTOMY  BILE LEAK S/P BILIARY STENT - stent removal 2/2/17 per [facility]HA  YEAST INFECTED BILOMA S/P IR DRAINAGE - completed Fluconazole/Unasyn 1/15/17 per ID  RECURRENT RIGHT EFFUSION S/P PRIOR THORACENTESIS - F/U CXR improved  S/P BILAT HYDRONEPHROSIS  S/P KALEB - 2/2 Vanco, renal fxn recovered, Nephro s/o  ANEMIA - may be able to D/C Fe/Vit C soon  TRANSAMINITIS - LFT's improving off TPN  HCV  PAIN CONTROL - weaned off Fentanyl Patch, decreased Oxycodone and increased Oxycontin  CONSTIPATION - resolved  SPCM - taking PO well, off TPN  DEBILITY - PT/OT    Reviewed w/ pt and Guards        Medications reviewed and Labs reviewed  [place] catheter: No [place]  Central line in place: Need for access    DVT Prophylaxis: Heparin    Ulcer prophylaxis: Yes

## 2017-01-31 LAB
ANION GAP SERPL CALC-SCNC: 9 MMOL/L (ref 0–11.9)
BASOPHILS # BLD AUTO: 0.3 % (ref 0–1.8)
BASOPHILS # BLD: 0.02 K/UL (ref 0–0.12)
BUN SERPL-MCNC: 17 MG/DL (ref 8–22)
CALCIUM SERPL-MCNC: 9.4 MG/DL (ref 8.4–10.2)
CHLORIDE SERPL-SCNC: 99 MMOL/L (ref 96–112)
CO2 SERPL-SCNC: 24 MMOL/L (ref 20–33)
CREAT SERPL-MCNC: 0.86 MG/DL (ref 0.5–1.4)
EOSINOPHIL # BLD AUTO: 0.22 K/UL (ref 0–0.51)
EOSINOPHIL NFR BLD: 3.1 % (ref 0–6.9)
ERYTHROCYTE [DISTWIDTH] IN BLOOD BY AUTOMATED COUNT: 39.2 FL (ref 35.9–50)
GFR SERPL CREATININE-BSD FRML MDRD: >60 ML/MIN/1.73 M 2
GLUCOSE SERPL-MCNC: 93 MG/DL (ref 65–99)
HCT VFR BLD AUTO: 33 % (ref 42–52)
HGB BLD-MCNC: 11.5 G/DL (ref 14–18)
IMM GRANULOCYTES # BLD AUTO: 0.04 K/UL (ref 0–0.11)
IMM GRANULOCYTES NFR BLD AUTO: 0.6 % (ref 0–0.9)
LYMPHOCYTES # BLD AUTO: 1.65 K/UL (ref 1–4.8)
LYMPHOCYTES NFR BLD: 23.5 % (ref 22–41)
MCH RBC QN AUTO: 26.5 PG (ref 27–33)
MCHC RBC AUTO-ENTMCNC: 34.8 G/DL (ref 33.7–35.3)
MCV RBC AUTO: 76 FL (ref 81.4–97.8)
MONOCYTES # BLD AUTO: 0.64 K/UL (ref 0–0.85)
MONOCYTES NFR BLD AUTO: 9.1 % (ref 0–13.4)
NEUTROPHILS # BLD AUTO: 4.46 K/UL (ref 1.82–7.42)
NEUTROPHILS NFR BLD: 63.4 % (ref 44–72)
NRBC # BLD AUTO: 0 K/UL
NRBC BLD AUTO-RTO: 0 /100 WBC
PLATELET # BLD AUTO: 242 K/UL (ref 164–446)
PMV BLD AUTO: 9.4 FL (ref 9–12.9)
POTASSIUM SERPL-SCNC: 4.1 MMOL/L (ref 3.6–5.5)
RBC # BLD AUTO: 4.34 M/UL (ref 4.7–6.1)
SODIUM SERPL-SCNC: 132 MMOL/L (ref 135–145)
WBC # BLD AUTO: 7 K/UL (ref 4.8–10.8)

## 2017-01-31 PROCEDURE — 85025 COMPLETE CBC W/AUTO DIFF WBC: CPT

## 2017-01-31 PROCEDURE — 80048 BASIC METABOLIC PNL TOTAL CA: CPT

## 2017-01-31 PROCEDURE — 99231 SBSQ HOSP IP/OBS SF/LOW 25: CPT | Performed by: HOSPITALIST

## 2017-01-31 ASSESSMENT — ENCOUNTER SYMPTOMS
FEVER: 0
PALPITATIONS: 0
COUGH: 0
NAUSEA: 0
ABDOMINAL PAIN: 1
DIARRHEA: 0
DIZZINESS: 0
VOMITING: 0
CONSTIPATION: 0
BACK PAIN: 0
HEADACHES: 0
SHORTNESS OF BREATH: 0

## 2017-01-31 NOTE — [facility]
Hospital Medicine Progress Note, Adult, Complex               Author: Keisha Geraldine Date & Time created: 1/30/2017  11:48 PM     Interval History:  CC - infected biloma  No more nausea, abd pain tolerable, eating well.    Review of Systems:  Review of Systems   Constitutional: Negative for fever and chills.   HENT: Negative.    Eyes: Negative.    Respiratory: Negative for cough and shortness of breath.    Cardiovascular: Negative for chest pain and palpitations.   Gastrointestinal: Positive for abdominal pain. Negative for nausea and vomiting.   Genitourinary: Negative.    Musculoskeletal: Negative.    Skin: Negative for itching and rash.   Endo/Heme/Allergies: Negative.        Physical Exam:  Physical Exam   Constitutional: He is oriented to person, place, and time. He appears well-developed and well-nourished. No distress.   HENT:   Head: Normocephalic and atraumatic.   Right Ear: External ear normal.   Left Ear: External ear normal.   Nose: Nose normal.   Mouth/Throat: Oropharynx is clear and moist. No oropharyngeal exudate.   Eyes: Conjunctivae and EOM are normal. Right eye exhibits no discharge. Left eye exhibits no discharge.   Neck: Normal range of motion. Neck supple. No tracheal deviation present.   Cardiovascular: Normal rate and regular rhythm.    Pulmonary/Chest: Effort normal and breath sounds normal. No stridor. No respiratory distress. He has no wheezes.   Abdominal: Soft. Bowel sounds are normal. He exhibits no distension. There is no tenderness. There is no rebound and no guarding.   Musculoskeletal: He exhibits no edema or tenderness.   Neurological: He is alert and oriented to person, place, and time.   Skin: Skin is warm and dry. He is not diaphoretic.   Vitals reviewed.      Labs:        Invalid input(s): [id number]      No results for input(s): SODIUM, POTASSIUM, CHLORIDE, CO2, BUN, CREATININE, MAGNESIUM, PHOSPHORUS, CALCIUM in the last 72 hours.  No results for input(s): ALTSGPT, ASTSGOT,  ALKPHOSPHAT, TBILIRUBIN, DBILIRUBIN, GAMMAGT, AMYLASE, LIPASE, ALB, PREALBUMIN, GLUCOSE in the last 72 hours.  No results for input(s): RBC, HEMOGLOBIN, HEMATOCRIT, PLATELETCT, PROTHROMBTM, APTT, INR, IRON, FERRITIN, TOTIRONBC in the last 72 hours.              Medical Decision Making, by Problem:  S/P RECENT CHOLECYSTECTOMY  BILE LEAK S/P BILIARY STENT - stent removal 2/2/17 per [facility]  YEAST INFECTED BILOMA S/P IR DRAINAGE - completed Fluconazole/Unasyn 1/15/17 per ID  RECURRENT RIGHT EFFUSION S/P PRIOR THORACENTESIS - F/U CXR improved  S/P BILAT HYDRONEPHROSIS  S/P KALEB - 2/2 Vanco, renal fxn recovered, Nephro s/o  ANEMIA - may be able to D/C Fe/Vit C soon  TRANSAMINITIS - LFT's improving off TPN  HCV  PAIN CONTROL - weaned off Fentanyl Patch, decreased Oxycodone and increased Oxycontin  CONSTIPATION - resolved  SPCM - taking PO well, off TPN  DEBILITY - PT/OT    Reviewed w/ pt, Guards, and CM.  Updated [facility] last week.        Medications reviewed and Labs reviewed  [place] catheter: No [place]  Central line in place: Need for access    DVT Prophylaxis: Heparin    Ulcer prophylaxis: Yes

## 2017-01-31 NOTE — [facility]
Hospital Medicine Progress Note, Adult, Complex               Author: Paige Farrell Date & Time created: 1/31/2017  11:20 AM     CC: bile leak    Interval History:  Stent removal planned 2/2  Wants oxycodone increased    Review of Systems:  Review of Systems   Constitutional: Negative for fever.   Respiratory: Negative for cough and shortness of breath.    Cardiovascular: Negative for chest pain and palpitations.   Gastrointestinal: Positive for abdominal pain. Negative for nausea, vomiting, diarrhea and constipation.   Musculoskeletal: Negative for back pain.   Neurological: Negative for dizziness and headaches.       T97.8P78 /62GP70If20 96% ra I/O2.1/brp  noBM   Physical Exam   Constitutional: He is oriented to person, place, and time. He appears well-developed.   HENT:   Head: Normocephalic and atraumatic.   Eyes: Conjunctivae are normal. Right eye exhibits no discharge. Left eye exhibits no discharge. No scleral icterus.   Neck: No tracheal deviation present.   Cardiovascular: Regular rhythm and intact distal pulses.    No murmur heard.  Pulmonary/Chest: Effort normal. No respiratory distress. He exhibits no tenderness.   Abdominal: Soft. Bowel sounds are normal. He exhibits no distension. There is tenderness (RUQ). There is no rebound and no guarding.   Musculoskeletal: He exhibits no edema.   Neurological: He is alert and oriented to person, place, and time.   Skin: Skin is warm.   Multiple tatoos   Vitals reviewed.      Labs:        Invalid input(s): [id number]      Recent Labs      01/31/17   0235   SODIUM  132*   POTASSIUM  4.1   CHLORIDE  99   CO2  24   BUN  17   CREATININE  0.86   CALCIUM  9.4     Recent Labs      01/31/17   0235   GLUCOSE  93     Recent Labs      01/31/17   0235   RBC  4.34*   HEMOGLOBIN  11.5*   HEMATOCRIT  33.0*   PLATELETCT  242     Recent Labs      01/31/17   0235   WBC  7.0   NEUTSPOLYS  63.40   LYMPHOCYTES  23.50   MONOCYTES  9.10   EOSINOPHILS  3.10   BASOPHILS  0.30        Medical Decision Making, by Problem:  AP   -stable, labs and imaging unrevealing   -following clinically  S/p cholecystectomy PTA  Bile leak treated with biliary stent   -stent removal 2/2  Biloma s/p IR drainage infected with candida-abx completed  Vanco induced ARF-resolved  R thoracentesis hx  Anemia  Mild hyponatremia  HCV  Nutrition   -po    Medications reviewed and Labs reviewed  [place] catheter: No [place]      DVT Prophylaxis: Heparin

## 2017-02-01 PROCEDURE — 99231 SBSQ HOSP IP/OBS SF/LOW 25: CPT | Performed by: HOSPITALIST

## 2017-02-01 ASSESSMENT — ENCOUNTER SYMPTOMS
HEADACHES: 0
COUGH: 0
FEVER: 0
ABDOMINAL PAIN: 1
BACK PAIN: 0
NAUSEA: 0
VOMITING: 0
CONSTIPATION: 0
DIARRHEA: 0
SHORTNESS OF BREATH: 0

## 2017-02-01 NOTE — [facility]
Hospital Medicine Progress Note, Adult, Complex               Author: Paige MADELINE Farrell Date & Time created: 2/1/2017  11:11 AM     CC: bile leak    Interval History:  Epigastric pain today  Eating OK  Using morphine regularly    Review of Systems:  Review of Systems   Constitutional: Negative for fever.   Respiratory: Negative for cough and shortness of breath.    Cardiovascular: Negative for chest pain.   Gastrointestinal: Positive for abdominal pain. Negative for nausea, vomiting, diarrhea and constipation.   Genitourinary: Negative for dysuria.   Musculoskeletal: Negative for back pain.   Neurological: Negative for headaches.       T 100.1 P 85 /76 RR17 Sa02 98% ra I/O2.2/brp  noBM   Physical Exam   Constitutional: He appears well-developed. No distress.   HENT:   Head: Normocephalic and atraumatic.   Eyes: Conjunctivae are normal. Right eye exhibits no discharge. Left eye exhibits no discharge. No scleral icterus.   Neck: No tracheal deviation present.   Cardiovascular: Normal rate, regular rhythm and intact distal pulses.    No murmur heard.  Pulmonary/Chest: Effort normal. No respiratory distress. He has no wheezes. He exhibits no tenderness.   Abdominal: Soft. Bowel sounds are normal. He exhibits no distension. There is tenderness (RUQ/epigatrum). There is no rebound and no guarding.   Musculoskeletal: He exhibits no edema.   Neurological: He is alert.   Skin: Skin is warm. No erythema.   Multiple tatoos   Vitals reviewed.      Labs:        Invalid input(s): [id number]      Recent Labs      01/31/17   0235   SODIUM  132*   POTASSIUM  4.1   CHLORIDE  99   CO2  24   BUN  17   CREATININE  0.86   CALCIUM  9.4     Recent Labs      01/31/17   0235   GLUCOSE  93     Recent Labs      01/31/17   0235   RBC  4.34*   HEMOGLOBIN  11.5*   HEMATOCRIT  33.0*   PLATELETCT  242     Recent Labs      01/31/17 0235   WBC  7.0   NEUTSPOLYS  63.40   LYMPHOCYTES  23.50   MONOCYTES  9.10   EOSINOPHILS  3.10   BASOPHILS   0.30       Medical Decision Making, by Problem:  AP   -following clinically   -am labs  Low grade fever   -follow clinically, non toxic  S/p cholecystectomy PTA  Bile leak treated with biliary stent   -stent removal tomorrow  Biloma s/p IR drainage infected with candida-abx completed  Vanco induced ARF-resolved  R thoracentesis hx  Anemia  Mild hyponatremia  HCV  Nutrition   -po    Medications reviewed  [place] catheter: No [place]      DVT Prophylaxis: Heparin

## 2017-02-02 ENCOUNTER — APPOINTMENT (OUTPATIENT)
Dept: RADIOLOGY | Facility: MEDICAL CENTER | Age: 27
End: 2017-02-02
Attending: INTERNAL MEDICINE
Payer: COMMERCIAL

## 2017-02-02 ENCOUNTER — APPOINTMENT (OUTPATIENT)
Dept: RADIOLOGY | Facility: MEDICAL CENTER | Age: 27
End: 2017-02-02
Attending: HOSPITALIST
Payer: COMMERCIAL

## 2017-02-02 ENCOUNTER — HOSPITAL ENCOUNTER (OUTPATIENT)
Facility: MEDICAL CENTER | Age: 27
End: 2017-02-06
Attending: HOSPITALIST | Admitting: HOSPITALIST
Payer: COMMERCIAL

## 2017-02-02 PROBLEM — K83.9 BILE LEAK: Status: ACTIVE | Noted: 2017-02-02

## 2017-02-02 LAB
ALBUMIN SERPL BCP-MCNC: 3.1 G/DL (ref 3.2–4.9)
ALBUMIN/GLOB SERPL: 0.8 G/DL
ALP SERPL-CCNC: 638 U/L (ref 30–99)
ALT SERPL-CCNC: 320 U/L (ref 2–50)
ANION GAP SERPL CALC-SCNC: 9 MMOL/L (ref 0–11.9)
AST SERPL-CCNC: 282 U/L (ref 12–45)
BASOPHILS # BLD AUTO: 0.3 % (ref 0–1.8)
BASOPHILS # BLD: 0.02 K/UL (ref 0–0.12)
BILIRUB SERPL-MCNC: 1.2 MG/DL (ref 0.1–1.5)
BUN SERPL-MCNC: 12 MG/DL (ref 8–22)
CALCIUM SERPL-MCNC: 9.5 MG/DL (ref 8.4–10.2)
CHLORIDE SERPL-SCNC: 98 MMOL/L (ref 96–112)
CO2 SERPL-SCNC: 25 MMOL/L (ref 20–33)
CREAT SERPL-MCNC: 0.91 MG/DL (ref 0.5–1.4)
EOSINOPHIL # BLD AUTO: 0.2 K/UL (ref 0–0.51)
EOSINOPHIL NFR BLD: 3.3 % (ref 0–6.9)
ERYTHROCYTE [DISTWIDTH] IN BLOOD BY AUTOMATED COUNT: 40 FL (ref 35.9–50)
GFR SERPL CREATININE-BSD FRML MDRD: >60 ML/MIN/1.73 M 2
GLOBULIN SER CALC-MCNC: 3.9 G/DL (ref 1.9–3.5)
GLUCOSE SERPL-MCNC: 98 MG/DL (ref 65–99)
HCT VFR BLD AUTO: 35.4 % (ref 42–52)
HGB BLD-MCNC: 11.8 G/DL (ref 14–18)
IMM GRANULOCYTES # BLD AUTO: 0.03 K/UL (ref 0–0.11)
IMM GRANULOCYTES NFR BLD AUTO: 0.5 % (ref 0–0.9)
LIPASE SERPL-CCNC: 40 U/L (ref 7–58)
LYMPHOCYTES # BLD AUTO: 1.46 K/UL (ref 1–4.8)
LYMPHOCYTES NFR BLD: 24.1 % (ref 22–41)
MCH RBC QN AUTO: 25.9 PG (ref 27–33)
MCHC RBC AUTO-ENTMCNC: 33.3 G/DL (ref 33.7–35.3)
MCV RBC AUTO: 77.8 FL (ref 81.4–97.8)
MONOCYTES # BLD AUTO: 0.61 K/UL (ref 0–0.85)
MONOCYTES NFR BLD AUTO: 10 % (ref 0–13.4)
NEUTROPHILS # BLD AUTO: 3.75 K/UL (ref 1.82–7.42)
NEUTROPHILS NFR BLD: 61.8 % (ref 44–72)
NRBC # BLD AUTO: 0 K/UL
NRBC BLD AUTO-RTO: 0 /100 WBC
PLATELET # BLD AUTO: 226 K/UL (ref 164–446)
PMV BLD AUTO: 9.4 FL (ref 9–12.9)
POTASSIUM SERPL-SCNC: 4 MMOL/L (ref 3.6–5.5)
PROT SERPL-MCNC: 7 G/DL (ref 6–8.2)
RBC # BLD AUTO: 4.55 M/UL (ref 4.7–6.1)
SODIUM SERPL-SCNC: 132 MMOL/L (ref 135–145)
WBC # BLD AUTO: 6.1 K/UL (ref 4.8–10.8)

## 2017-02-02 PROCEDURE — 700101 HCHG RX REV CODE 250

## 2017-02-02 PROCEDURE — 83690 ASSAY OF LIPASE: CPT

## 2017-02-02 PROCEDURE — 85025 COMPLETE CBC W/AUTO DIFF WBC: CPT

## 2017-02-02 PROCEDURE — 74328 X-RAY BILE DUCT ENDOSCOPY: CPT

## 2017-02-02 PROCEDURE — 76705 ECHO EXAM OF ABDOMEN: CPT

## 2017-02-02 PROCEDURE — 80053 COMPREHEN METABOLIC PANEL: CPT

## 2017-02-02 PROCEDURE — 99232 SBSQ HOSP IP/OBS MODERATE 35: CPT | Performed by: HOSPITALIST

## 2017-02-02 PROCEDURE — 74176 CT ABD & PELVIS W/O CONTRAST: CPT

## 2017-02-02 PROCEDURE — 700111 HCHG RX REV CODE 636 W/ 250 OVERRIDE (IP)

## 2017-02-02 ASSESSMENT — ENCOUNTER SYMPTOMS
VOMITING: 0
DIARRHEA: 0
NAUSEA: 0
HEADACHES: 0
BACK PAIN: 0
ABDOMINAL PAIN: 1
CONSTIPATION: 0
COUGH: 0
FEVER: 0
SHORTNESS OF BREATH: 0

## 2017-02-02 NOTE — [facility]
Hospital Medicine Progress Note, Adult, Complex               Author: Paige Farrell Date & Time created: 2/2/2017  3:43 PM     CC: bile leak    Interval History:  ERCP with stent removal  Interval increase in LFTs  US with possible increase fluid  Ongoing pain    Review of Systems:  Review of Systems   Constitutional: Negative for fever.   Respiratory: Negative for cough and shortness of breath.    Cardiovascular: Negative for chest pain.   Gastrointestinal: Positive for abdominal pain. Negative for nausea, vomiting, diarrhea and constipation.   Genitourinary: Negative for dysuria.   Musculoskeletal: Negative for back pain.   Neurological: Negative for headaches.       T97.9 P 91 /69 RR19 Sa02 95% ra I/O1.7/brp  noBM   Physical Exam   Constitutional: He appears well-developed.   HENT:   Head: Normocephalic and atraumatic.   Eyes: Conjunctivae are normal. Right eye exhibits no discharge. Left eye exhibits no discharge.   Neck: No tracheal deviation present.   Cardiovascular: Normal rate, regular rhythm and intact distal pulses.    No murmur heard.  Pulmonary/Chest: Effort normal. No respiratory distress. He exhibits no tenderness.   Abdominal: Soft. Bowel sounds are normal. He exhibits no distension. There is tenderness (RUQ/epigatrum). There is no rebound and no guarding.   Musculoskeletal: He exhibits no edema.   Neurological: He is alert.   Skin: Skin is warm. No erythema.   Multiple tatoos   Vitals reviewed.      Labs:        Invalid input(s): [id number]      Recent Labs      01/31/17 0235 02/02/17   0340   SODIUM  132*  132*   POTASSIUM  4.1  4.0   CHLORIDE  99  98   CO2  24  25   BUN  17  12   CREATININE  0.86  0.91   CALCIUM  9.4  9.5     Recent Labs      01/31/17 0235 02/02/17   0340   ALTSGPT   --   320*   ASTSGOT   --   282*   ALKPHOSPHAT   --   638*   TBILIRUBIN   --   1.2   LIPASE   --   40   GLUCOSE  93  98     Recent Labs      01/31/17 0235 02/02/17   0340   RBC  4.34*  4.55*    HEMOGLOBIN  11.5*  11.8*   HEMATOCRIT  33.0*  35.4*   PLATELETCT  242  226     Recent Labs      01/31/17   0235  02/02/17   0340   WBC  7.0  6.1   NEUTSPOLYS  63.40  61.80   LYMPHOCYTES  23.50  24.10   MONOCYTES  9.10  10.00   EOSINOPHILS  3.10  3.30   BASOPHILS  0.30  0.30   ASTSGOT   --   282*   ALTSGPT   --   320*   ALKPHOSPHAT   --   638*   TBILIRUBIN   --   1.2       Medical Decision Making, by Problem:  AP   -check CT with abnl US to eval fluid collections  Transaminitis   -follow up in am   -? Stent related  S/p cholecystectomy PTA  Bile leak treated with biliary stent   -stent removed  Biloma s/p IR drainage infected with candida-abx completed  Vanco induced ARF-resolved  R thoracentesis hx  Anemia  Mild hyponatremia  HCV  Nutrition   -po    Medications reviewed, Labs reviewed and Radiology images reviewed  [place] catheter: No [place]      DVT Prophylaxis: Heparin

## 2017-02-03 LAB
ALBUMIN SERPL BCP-MCNC: 3.3 G/DL (ref 3.2–4.9)
ALBUMIN/GLOB SERPL: 0.7 G/DL
ALP SERPL-CCNC: 581 U/L (ref 30–99)
ALT SERPL-CCNC: 287 U/L (ref 2–50)
ANION GAP SERPL CALC-SCNC: 9 MMOL/L (ref 0–11.9)
AST SERPL-CCNC: 159 U/L (ref 12–45)
BASOPHILS # BLD AUTO: 0.5 % (ref 0–1.8)
BASOPHILS # BLD: 0.03 K/UL (ref 0–0.12)
BILIRUB SERPL-MCNC: 0.6 MG/DL (ref 0.1–1.5)
BUN SERPL-MCNC: 14 MG/DL (ref 8–22)
CALCIUM SERPL-MCNC: 9.3 MG/DL (ref 8.4–10.2)
CHLORIDE SERPL-SCNC: 102 MMOL/L (ref 96–112)
CO2 SERPL-SCNC: 24 MMOL/L (ref 20–33)
CREAT SERPL-MCNC: 0.74 MG/DL (ref 0.5–1.4)
EOSINOPHIL # BLD AUTO: 0.24 K/UL (ref 0–0.51)
EOSINOPHIL NFR BLD: 4.1 % (ref 0–6.9)
ERYTHROCYTE [DISTWIDTH] IN BLOOD BY AUTOMATED COUNT: 40.4 FL (ref 35.9–50)
GFR SERPL CREATININE-BSD FRML MDRD: >60 ML/MIN/1.73 M 2
GLOBULIN SER CALC-MCNC: 4.5 G/DL (ref 1.9–3.5)
GLUCOSE SERPL-MCNC: 104 MG/DL (ref 65–99)
HCT VFR BLD AUTO: 33.4 % (ref 42–52)
HGB BLD-MCNC: 11.6 G/DL (ref 14–18)
IMM GRANULOCYTES # BLD AUTO: 0.03 K/UL (ref 0–0.11)
IMM GRANULOCYTES NFR BLD AUTO: 0.5 % (ref 0–0.9)
LYMPHOCYTES # BLD AUTO: 1.6 K/UL (ref 1–4.8)
LYMPHOCYTES NFR BLD: 27.2 % (ref 22–41)
MCH RBC QN AUTO: 26.5 PG (ref 27–33)
MCHC RBC AUTO-ENTMCNC: 34.7 G/DL (ref 33.7–35.3)
MCV RBC AUTO: 76.4 FL (ref 81.4–97.8)
MONOCYTES # BLD AUTO: 0.54 K/UL (ref 0–0.85)
MONOCYTES NFR BLD AUTO: 9.2 % (ref 0–13.4)
NEUTROPHILS # BLD AUTO: 3.45 K/UL (ref 1.82–7.42)
NEUTROPHILS NFR BLD: 58.5 % (ref 44–72)
NRBC # BLD AUTO: 0 K/UL
NRBC BLD AUTO-RTO: 0 /100 WBC
PLATELET # BLD AUTO: 211 K/UL (ref 164–446)
PMV BLD AUTO: 9.5 FL (ref 9–12.9)
POTASSIUM SERPL-SCNC: 4.1 MMOL/L (ref 3.6–5.5)
PROT SERPL-MCNC: 7.8 G/DL (ref 6–8.2)
RBC # BLD AUTO: 4.37 M/UL (ref 4.7–6.1)
SODIUM SERPL-SCNC: 135 MMOL/L (ref 135–145)
WBC # BLD AUTO: 5.9 K/UL (ref 4.8–10.8)

## 2017-02-03 PROCEDURE — 80053 COMPREHEN METABOLIC PANEL: CPT

## 2017-02-03 PROCEDURE — 99232 SBSQ HOSP IP/OBS MODERATE 35: CPT | Performed by: HOSPITALIST

## 2017-02-03 PROCEDURE — 85025 COMPLETE CBC W/AUTO DIFF WBC: CPT

## 2017-02-03 ASSESSMENT — ENCOUNTER SYMPTOMS
NAUSEA: 0
VOMITING: 0
DIARRHEA: 0
COUGH: 0
CONSTIPATION: 0
SHORTNESS OF BREATH: 0
BACK PAIN: 0
HEADACHES: 0
FEVER: 0
ABDOMINAL PAIN: 1

## 2017-02-03 NOTE — [facility]
Hospital Medicine Progress Note, Adult, Complex               Author: Paige MADELINE Farrell Date & Time created: 2/3/2017  10:09 AM     CC: bile leak    Interval History:  CT without changes  Improved pain    Review of Systems:  Review of Systems   Constitutional: Negative for fever.   Respiratory: Negative for cough and shortness of breath.    Cardiovascular: Negative for chest pain.   Gastrointestinal: Positive for abdominal pain. Negative for nausea, vomiting, diarrhea and constipation.   Genitourinary: Negative for dysuria.   Musculoskeletal: Negative for back pain.   Neurological: Negative for headaches.       T98.1P 81 /53VG891 Sa02 95% ra I/O2.3/brp  noBM   Physical Exam   Constitutional: He is oriented to person, place, and time. He appears well-developed. No distress.   HENT:   Head: Normocephalic and atraumatic.   Eyes: Conjunctivae are normal. Right eye exhibits no discharge. Left eye exhibits no discharge. No scleral icterus.   Neck: No tracheal deviation present.   Cardiovascular: Normal rate, regular rhythm and intact distal pulses.    No murmur heard.  Pulmonary/Chest: Effort normal. No respiratory distress. He has no wheezes. He exhibits no tenderness.   Abdominal: Soft. Bowel sounds are normal. He exhibits no distension. There is tenderness (RUQ). There is no rebound and no guarding.   Musculoskeletal: He exhibits no edema.   Neurological: He is alert and oriented to person, place, and time.   Skin: Skin is warm. No erythema.   Multiple tatoos   Vitals reviewed.      Labs:        Invalid input(s): [id number]      Recent Labs      02/02/17   0340  02/03/17   0500   SODIUM  132*  135   POTASSIUM  4.0  4.1   CHLORIDE  98  102   CO2  25  24   BUN  12  14   CREATININE  0.91  0.74   CALCIUM  9.5  9.3     Recent Labs      02/02/17   0340  02/03/17   0500   ALTSGPT  320*  287*   ASTSGOT  282*  159*   ALKPHOSPHAT  638*  581*   TBILIRUBIN  1.2  0.6   LIPASE  40   --    GLUCOSE  98  104*     Recent Labs       02/02/17   0340  02/03/17   0500   RBC  4.55*  4.37*   HEMOGLOBIN  11.8*  11.6*   HEMATOCRIT  35.4*  33.4*   PLATELETCT  226  211     Recent Labs      02/02/17   0340  02/03/17   0500   WBC  6.1  5.9   NEUTSPOLYS  61.80  58.50   LYMPHOCYTES  24.10  27.20   MONOCYTES  10.00  9.20   EOSINOPHILS  3.30  4.10   BASOPHILS  0.30  0.50   ASTSGOT  282*  159*   ALTSGPT  320*  287*   ALKPHOSPHAT  638*  581*   TBILIRUBIN  1.2  0.6       Medical Decision Making, by Problem:  AP   -CT stable   -LFTs better   -anticipate improvement with stent removal   -trial of toradol  Transaminitis   -improved  S/p cholecystectomy PTA  Bile leak treated with biliary stent   -stent removed  Biloma s/p IR drainage infected with candida-abx completed  Vanco induced ARF-resolved  R thoracentesis hx  Anemia  HCV  Nutrition   -po    Medications reviewed, Labs reviewed and Radiology images reviewed  [place] catheter: No [place]      DVT Prophylaxis: Heparin

## 2017-02-04 LAB
ALBUMIN SERPL BCP-MCNC: 3.1 G/DL (ref 3.2–4.9)
ALBUMIN/GLOB SERPL: 0.8 G/DL
ALP SERPL-CCNC: 482 U/L (ref 30–99)
ALT SERPL-CCNC: 251 U/L (ref 2–50)
ANION GAP SERPL CALC-SCNC: 8 MMOL/L (ref 0–11.9)
AST SERPL-CCNC: 113 U/L (ref 12–45)
BILIRUB SERPL-MCNC: 0.8 MG/DL (ref 0.1–1.5)
BUN SERPL-MCNC: 11 MG/DL (ref 8–22)
CALCIUM SERPL-MCNC: 9.5 MG/DL (ref 8.4–10.2)
CHLORIDE SERPL-SCNC: 103 MMOL/L (ref 96–112)
CO2 SERPL-SCNC: 26 MMOL/L (ref 20–33)
CREAT SERPL-MCNC: 0.86 MG/DL (ref 0.5–1.4)
GFR SERPL CREATININE-BSD FRML MDRD: >60 ML/MIN/1.73 M 2
GLOBULIN SER CALC-MCNC: 3.8 G/DL (ref 1.9–3.5)
GLUCOSE SERPL-MCNC: 103 MG/DL (ref 65–99)
POTASSIUM SERPL-SCNC: 4.1 MMOL/L (ref 3.6–5.5)
PROT SERPL-MCNC: 6.9 G/DL (ref 6–8.2)
SODIUM SERPL-SCNC: 137 MMOL/L (ref 135–145)

## 2017-02-04 PROCEDURE — 80053 COMPREHEN METABOLIC PANEL: CPT

## 2017-02-04 PROCEDURE — 99232 SBSQ HOSP IP/OBS MODERATE 35: CPT | Performed by: HOSPITALIST

## 2017-02-04 ASSESSMENT — ENCOUNTER SYMPTOMS
DIARRHEA: 0
ABDOMINAL PAIN: 1
HEADACHES: 0
SHORTNESS OF BREATH: 0
COUGH: 0
CONSTIPATION: 0
FEVER: 0
BACK PAIN: 0
NAUSEA: 0
VOMITING: 0

## 2017-02-04 NOTE — [facility]
Hospital Medicine Progress Note, Adult, Complex               Author: Paige MADELINE Farrell Date & Time created: 2/4/2017  12:31 PM     CC: bile leak    Interval History:  No events  Pain seems superficial  Slept well  Tolerating diet  LFTs improved    Review of Systems:  Review of Systems   Constitutional: Negative for fever.   Respiratory: Negative for cough and shortness of breath.    Cardiovascular: Negative for chest pain.   Gastrointestinal: Positive for abdominal pain. Negative for nausea, vomiting, diarrhea and constipation.   Genitourinary: Negative for dysuria.   Musculoskeletal: Negative for back pain.   Neurological: Negative for headaches.       T99 P 70 /70RR16 Sa02 97% ra I/O2.8/brp 2BM   Physical Exam   Constitutional: He is oriented to person, place, and time. He appears well-developed. No distress.   HENT:   Head: Normocephalic and atraumatic.   Eyes: Conjunctivae are normal. Right eye exhibits no discharge. Left eye exhibits no discharge. No scleral icterus.   Neck: No tracheal deviation present.   Cardiovascular: Normal rate, regular rhythm and intact distal pulses.    No murmur heard.  Pulmonary/Chest: Effort normal. No respiratory distress. He has no wheezes. He exhibits no tenderness.   Abdominal: Soft. Bowel sounds are normal. He exhibits no distension. There is tenderness (RUQ). There is no rebound and no guarding.   Musculoskeletal: He exhibits no edema.   Neurological: He is alert and oriented to person, place, and time.   Skin: Skin is warm. No rash noted. No erythema.   Multiple tatoos   Vitals reviewed.      Labs:        Invalid input(s): [id number]      Recent Labs      02/02/17   0340  02/03/17   0500  02/04/17   0630   SODIUM  132*  135  137   POTASSIUM  4.0  4.1  4.1   CHLORIDE  98  102  103   CO2  25  24  26   BUN  12  14  11   CREATININE  0.91  0.74  0.86   CALCIUM  9.5  9.3  9.5     Recent Labs      02/02/17   0340  02/03/17   0500  02/04/17   0630   ALTSGPT  320*  287*  251*    ASTSGOT  282*  159*  113*   ALKPHOSPHAT  638*  581*  482*   TBILIRUBIN  1.2  0.6  0.8   LIPASE  40   --    --    GLUCOSE  98  104*  103*     Recent Labs      02/02/17   0340  02/03/17   0500   RBC  4.55*  4.37*   HEMOGLOBIN  11.8*  11.6*   HEMATOCRIT  35.4*  33.4*   PLATELETCT  226  211     Recent Labs      02/02/17   0340  02/03/17   0500  02/04/17   0630   WBC  6.1  5.9   --    NEUTSPOLYS  61.80  58.50   --    LYMPHOCYTES  24.10  27.20   --    MONOCYTES  10.00  9.20   --    EOSINOPHILS  3.30  4.10   --    BASOPHILS  0.30  0.50   --    ASTSGOT  282*  159*  113*   ALTSGPT  320*  287*  251*   ALKPHOSPHAT  638*  581*  482*   TBILIRUBIN  1.2  0.6  0.8       Medical Decision Making, by Problem:  AP   -CT stable   -LFTs better   -follow clinically  Transaminitis   -improving  S/p cholecystectomy PTA  Bile leak treated with biliary stent   -stent removed  Biloma s/p IR drainage infected with candida-abx completed  Vanco induced ARF-resolved  R thoracentesis hx  Anemia  HCV  Nutrition   -po    Medications reviewed and Labs reviewed  [place] catheter: No [place]      DVT Prophylaxis: Heparin

## 2017-02-05 LAB
ALBUMIN SERPL BCP-MCNC: 3.2 G/DL (ref 3.2–4.9)
ALP SERPL-CCNC: 395 U/L (ref 30–99)
ALT SERPL-CCNC: 187 U/L (ref 2–50)
AST SERPL-CCNC: 76 U/L (ref 12–45)
BILIRUB CONJ SERPL-MCNC: 0.2 MG/DL (ref 0.1–0.5)
BILIRUB INDIRECT SERPL-MCNC: 0.2 MG/DL (ref 0–1)
BILIRUB SERPL-MCNC: 0.4 MG/DL (ref 0.1–1.5)
PROT SERPL-MCNC: 7.1 G/DL (ref 6–8.2)

## 2017-02-05 PROCEDURE — 80076 HEPATIC FUNCTION PANEL: CPT

## 2017-02-05 PROCEDURE — 99232 SBSQ HOSP IP/OBS MODERATE 35: CPT | Performed by: HOSPITALIST

## 2017-02-05 ASSESSMENT — ENCOUNTER SYMPTOMS
INSOMNIA: 0
HEADACHES: 0
NAUSEA: 0
ABDOMINAL PAIN: 1
VOMITING: 0
MYALGIAS: 0
CONSTIPATION: 0
SHORTNESS OF BREATH: 0
FEVER: 0
COUGH: 0
DIARRHEA: 0

## 2017-02-05 NOTE — [facility]
Hospital Medicine Progress Note, Adult, Complex               Author: Paige CORREA Jami Date & Time created: 2/5/2017  12:42 PM     CC: bile leak    Interval History:  No change in RUQ pain  Nausea last night after BM, none today  Eating well    Review of Systems:  Review of Systems   Constitutional: Negative for fever.   Respiratory: Negative for cough and shortness of breath.    Cardiovascular: Negative for chest pain.   Gastrointestinal: Positive for abdominal pain. Negative for nausea, vomiting, diarrhea and constipation.   Genitourinary: Negative for dysuria.   Musculoskeletal: Negative for myalgias.   Neurological: Negative for headaches.   Psychiatric/Behavioral: The patient does not have insomnia.        T98.3P 73 /70RR16 Sa02 97% ra I/O2.7/brp 1BM   Physical Exam   Constitutional: He is oriented to person, place, and time. He appears well-developed.   HENT:   Head: Normocephalic and atraumatic.   Eyes: Right eye exhibits no discharge. Left eye exhibits no discharge. No scleral icterus.   Neck: Neck supple. No tracheal deviation present.   Cardiovascular: Normal rate, regular rhythm and intact distal pulses.    No murmur heard.  Pulmonary/Chest: Effort normal and breath sounds normal. No respiratory distress. He exhibits no tenderness.   Abdominal: Soft. Bowel sounds are normal. He exhibits no distension. There is tenderness (RUQ). There is no rebound and no guarding.   Musculoskeletal: He exhibits no edema.   Neurological: He is alert and oriented to person, place, and time.   Skin: Skin is warm. No erythema.   Multiple tatoos   Psychiatric: He has a normal mood and affect.   Vitals reviewed.      Labs:        Invalid input(s): [id number]      Recent Labs      02/03/17   0500  02/04/17   0630   SODIUM  135  137   POTASSIUM  4.1  4.1   CHLORIDE  102  103   CO2  24  26   BUN  14  11   CREATININE  0.74  0.86   CALCIUM  9.3  9.5     Recent Labs      02/03/17   0500  02/04/17   0630  02/05/17   0610    ALTSGPT  287*  251*  187*   ASTSGOT  159*  113*  76*   ALKPHOSPHAT  581*  482*  395*   TBILIRUBIN  0.6  0.8  0.4   DBILIRUBIN   --    --   0.2   GLUCOSE  104*  103*   --      Recent Labs      02/03/17   0500   RBC  4.37*   HEMOGLOBIN  11.6*   HEMATOCRIT  33.4*   PLATELETCT  211     Recent Labs      02/03/17   0500  02/04/17   0630  02/05/17   0610   WBC  5.9   --    --    NEUTSPOLYS  58.50   --    --    LYMPHOCYTES  27.20   --    --    MONOCYTES  9.20   --    --    EOSINOPHILS  4.10   --    --    BASOPHILS  0.50   --    --    ASTSGOT  159*  113*  76*   ALTSGPT  287*  251*  187*   ALKPHOSPHAT  581*  482*  395*   TBILIRUBIN  0.6  0.8  0.4       Medical Decision Making, by Problem:  AP   -CT stable   -LFTs better,normalizing   -increase oxycontin  Transaminitis   -follow to normalization  S/p cholecystectomy PTA  Bile leak treated with biliary stent   -stent removed  Biloma s/p IR drainage infected with candida-abx completed  Vanco induced ARF-resolved  R thoracentesis hx  Anemia  HCV  Nutrition   -po    Medications reviewed and Labs reviewed  [place] catheter: No [place]      DVT Prophylaxis: Heparin

## 2017-02-06 LAB
ALBUMIN SERPL BCP-MCNC: 3.4 G/DL (ref 3.2–4.9)
ALP SERPL-CCNC: 378 U/L (ref 30–99)
ALT SERPL-CCNC: 175 U/L (ref 2–50)
AST SERPL-CCNC: 71 U/L (ref 12–45)
BILIRUB CONJ SERPL-MCNC: 0.1 MG/DL (ref 0.1–0.5)
BILIRUB INDIRECT SERPL-MCNC: 0.4 MG/DL (ref 0–1)
BILIRUB SERPL-MCNC: 0.5 MG/DL (ref 0.1–1.5)
PROT SERPL-MCNC: 7.7 G/DL (ref 6–8.2)

## 2017-02-06 PROCEDURE — 80076 HEPATIC FUNCTION PANEL: CPT

## 2017-02-06 PROCEDURE — 99239 HOSP IP/OBS DSCHRG MGMT >30: CPT | Performed by: HOSPITALIST

## 2017-02-06 ASSESSMENT — ENCOUNTER SYMPTOMS
HEARTBURN: 0
CONSTIPATION: 0
DIARRHEA: 0
NAUSEA: 0
ABDOMINAL PAIN: 1
FEVER: 0
VOMITING: 0
PALPITATIONS: 0
HEADACHES: 0
INSOMNIA: 0
MYALGIAS: 0
SHORTNESS OF BREATH: 0
COUGH: 0

## 2017-02-06 NOTE — [facility]
Hospital Medicine Progress Note, Adult, Complex               Author: Paige Farrell Date & Time created: 2/6/2017  8:53 AM     CC: bile leak    Interval History:  oxycontin increased  LFTs trending down  Pain a little better    Review of Systems:  Review of Systems   Constitutional: Negative for fever.   Respiratory: Negative for cough and shortness of breath.    Cardiovascular: Negative for chest pain and palpitations.   Gastrointestinal: Positive for abdominal pain. Negative for heartburn, nausea, vomiting, diarrhea and constipation.   Genitourinary: Negative for dysuria.   Musculoskeletal: Negative for myalgias.   Neurological: Negative for headaches.   Psychiatric/Behavioral: The patient does not have insomnia.        T97.8P 79 /74RR16 Sa02 98% ra I/O2.7/brp 1BM   Physical Exam   Constitutional: He appears well-developed. No distress.   HENT:   Head: Normocephalic and atraumatic.   Eyes: Right eye exhibits no discharge. Left eye exhibits no discharge.   Neck: Neck supple. No tracheal deviation present.   Cardiovascular: Normal rate, regular rhythm and intact distal pulses.    No murmur heard.  Pulmonary/Chest: Effort normal and breath sounds normal. No respiratory distress. He exhibits no tenderness.   Abdominal: Soft. Bowel sounds are normal. He exhibits no distension. There is tenderness (RUQ). There is no rebound and no guarding.   Musculoskeletal: He exhibits no edema.   Neurological: He is alert.   Skin: Skin is warm. No erythema.   Multiple tatoos   Psychiatric: He has a normal mood and affect.   Vitals reviewed.      Labs:        Invalid input(s): [id number]      Recent Labs      02/04/17   0630   SODIUM  137   POTASSIUM  4.1   CHLORIDE  103   CO2  26   BUN  11   CREATININE  0.86   CALCIUM  9.5     Recent Labs      02/04/17   0630  02/05/17   0610  02/06/17   0445   ALTSGPT  251*  187*  175*   ASTSGOT  113*  76*  71*   ALKPHOSPHAT  482*  395*  378*   TBILIRUBIN  0.8  0.4  0.5   DBILIRUBIN   --    0.2  0.1   GLUCOSE  103*   --    --      No results for input(s): RBC, HEMOGLOBIN, HEMATOCRIT, PLATELETCT, PROTHROMBTM, APTT, INR, IRON, FERRITIN, TOTIRONBC in the last 72 hours.  Recent Labs      02/04/17   0630  02/05/17   0610  02/06/17   0445   ASTSGOT  113*  76*  71*   ALTSGPT  251*  187*  175*   ALKPHOSPHAT  482*  395*  378*   TBILIRUBIN  0.8  0.4  0.5       Medical Decision Making, by Problem:  AP, chronic RUQ after biloma, drains, choley   -CT stable   -LFTs better,normalizing   -follow on increased oxycontin  Transaminitis   -follow to normalization  S/p cholecystectomy PTA  Bile leak treated with biliary stent   -stent removed  Biloma s/p IR drainage infected with candida-abx completed  Vanco induced ARF-resolved  R thoracentesis hx  Anemia  HCV  Nutrition   -po  OK for transition back to shelter  Summary dictated  Medications reviewed and Labs reviewed  [place] catheter: No [place]      DVT Prophylaxis: Heparin

## 2017-05-17 ENCOUNTER — TELEMEDICINE2 (OUTPATIENT)
Dept: NEPHROLOGY | Facility: MEDICAL CENTER | Age: 27
End: 2017-05-17
Payer: COMMERCIAL

## 2017-05-17 VITALS
HEIGHT: 73 IN | BODY MASS INDEX: 32.87 KG/M2 | OXYGEN SATURATION: 96 % | WEIGHT: 248 LBS | DIASTOLIC BLOOD PRESSURE: 71 MMHG | TEMPERATURE: 96.1 F | SYSTOLIC BLOOD PRESSURE: 124 MMHG | HEART RATE: 64 BPM | RESPIRATION RATE: 16 BRPM

## 2017-05-17 DIAGNOSIS — N17.9 AKI (ACUTE KIDNEY INJURY) (HCC): ICD-10-CM

## 2017-05-17 DIAGNOSIS — I10 ESSENTIAL HYPERTENSION: ICD-10-CM

## 2017-05-17 PROCEDURE — 99203 OFFICE O/P NEW LOW 30 MIN: CPT | Mod: GT | Performed by: INTERNAL MEDICINE

## 2017-05-17 RX ORDER — FERROUS GLUCONATE 324(38)MG
324 TABLET ORAL
COMMUNITY

## 2017-05-17 ASSESSMENT — ENCOUNTER SYMPTOMS
PND: 0
CLAUDICATION: 0
WEIGHT LOSS: 0
FEVER: 0
SHORTNESS OF BREATH: 0
HEMOPTYSIS: 0
SPUTUM PRODUCTION: 0
PALPITATIONS: 0
ORTHOPNEA: 0
COUGH: 0
WHEEZING: 0
CHILLS: 0
FLANK PAIN: 0
EYES NEGATIVE: 1

## 2017-05-17 NOTE — PROGRESS NOTES
"Subjective:      Dante Joe is a 26 y.o. male who presents with Establish Care and Acute Renal Failure  Telemedicine          HPI  25 y/o male who was hospitalized with acute cholecystitis, s/p choly complicated with KALEB  Recovered  Recent creat level at 0.8 -baseline  Doing well, no complaints    Review of Systems   Constitutional: Negative for fever, chills, weight loss and malaise/fatigue.   HENT: Negative.    Eyes: Negative.    Respiratory: Negative for cough, hemoptysis, sputum production, shortness of breath and wheezing.    Cardiovascular: Negative for chest pain, palpitations, orthopnea, claudication, leg swelling and PND.   Genitourinary: Negative for dysuria, urgency, frequency, hematuria and flank pain.   Skin: Negative for itching and rash.   All other systems reviewed and are negative.         Objective:     /71 mmHg  Pulse 64  Temp(Src) 35.6 °C (96.1 °F)  Resp 16  Ht 1.854 m (6' 1\")  Wt 112.492 kg (248 lb)  BMI 32.73 kg/m2  SpO2 96%     Physical Exam   Constitutional: He is oriented to person, place, and time. He appears well-developed and well-nourished. No distress.   HENT:   Head: Normocephalic and atraumatic.   Nose: Nose normal.   Mouth/Throat: Oropharynx is clear and moist.   Eyes: EOM are normal. Pupils are equal, round, and reactive to light.   Neck: Normal range of motion. Neck supple.   Cardiovascular: Normal rate, regular rhythm and normal heart sounds.  Exam reveals no gallop and no friction rub.    Pulmonary/Chest: Effort normal. No respiratory distress. He has no wheezes. He has no rales.   Abdominal: Soft. Bowel sounds are normal. He exhibits no distension.   Musculoskeletal: He exhibits no edema.   Neurological: He is alert and oriented to person, place, and time.   Nursing note and vitals reviewed.         Laboratory results reviewed     Assessment/Plan:     1.KALEB -resolved  2.HTN: BP well controlled  3.Electrolytes: well controlled.      Recs: to monitor BMP             " F/u prn